# Patient Record
Sex: FEMALE | Race: ASIAN | NOT HISPANIC OR LATINO | Employment: FULL TIME
[De-identification: names, ages, dates, MRNs, and addresses within clinical notes are randomized per-mention and may not be internally consistent; named-entity substitution may affect disease eponyms.]

---

## 2021-02-25 ENCOUNTER — RECORDS - HEALTHEAST (OUTPATIENT)
Dept: ADMINISTRATIVE | Facility: OTHER | Age: 46
End: 2021-02-25

## 2021-02-25 LAB
ABO (EXTERNAL): NORMAL
HBA1C MFR BLD: 5.8 % (ref 0–5.6)
HEMOGLOBIN (EXTERNAL): 10.6 G/DL (ref 11.7–15.5)
HEPATITIS B SURFACE ANTIGEN (EXTERNAL): NONREACTIVE
HIV 1&2 EXT: NORMAL
HIV1+2 AB SERPL QL IA: NONREACTIVE
PLATELET COUNT (EXTERNAL): 476 10E3/UL (ref 140–400)
RH (EXTERNAL): POSITIVE
RUBELLA ANTIBODY IGG (EXTERNAL): NORMAL
TREPONEMA PALLIDUM ANTIBODY (EXTERNAL): NONREACTIVE

## 2021-03-01 ENCOUNTER — RECORDS - HEALTHEAST (OUTPATIENT)
Dept: ADMINISTRATIVE | Facility: OTHER | Age: 46
End: 2021-03-01

## 2021-03-01 ENCOUNTER — MEDICAL CORRESPONDENCE (OUTPATIENT)
Dept: EDUCATION SERVICES | Facility: CLINIC | Age: 46
End: 2021-03-01

## 2021-03-09 ENCOUNTER — RECORDS - HEALTHEAST (OUTPATIENT)
Dept: ADMINISTRATIVE | Facility: OTHER | Age: 46
End: 2021-03-09

## 2021-03-10 ENCOUNTER — TELEPHONE (OUTPATIENT)
Dept: ADMINISTRATIVE | Facility: CLINIC | Age: 46
End: 2021-03-10

## 2021-03-10 ENCOUNTER — COMMUNICATION - HEALTHEAST (OUTPATIENT)
Dept: ADMINISTRATIVE | Facility: CLINIC | Age: 46
End: 2021-03-10

## 2021-03-10 NOTE — TELEPHONE ENCOUNTER
Diabetes Education Scheduling Outreach #1:    Call to patient to schedule. Left message with phone number to call to schedule.    Plan for 2nd outreach attempt within 1 business day.    [facility]  Diabetes and Nutrition Scheduling

## 2021-03-11 NOTE — TELEPHONE ENCOUNTER
Diabetes Education Scheduling Outreach #2:    Call to patient to schedule. Left message with phone number to call to schedule.    Ashly Young  [place] [facility]  Diabetes and Nutrition Scheduling

## 2021-03-12 ENCOUNTER — RECORDS - HEALTHEAST (OUTPATIENT)
Dept: HEALTH INFORMATION MANAGEMENT | Facility: CLINIC | Age: 46
End: 2021-03-12

## 2021-03-12 NOTE — TELEPHONE ENCOUNTER
Diabetes Education Scheduling Outreach #3:    Call to patient to schedule. Talked with patient. She works Monday-Friday. She can do something around her lunch on Thursdays, anytime from 11am-1pm. She has had GDM with her twins before and still has her supplies and equipment. She remembers how to check her blood sugars but her main concern would be the diet/nutrition. She stated that even if the first appt is for 30 minutes, she'd take it. I offered a couple of appointments for 03/16 ad 03/18 but patient declined due to her work schedule. Will work with CDE's to see if we can work her in for preferred day/time.    Plan for 4th outreach attempt within 1 business day.    [facility]  Diabetes and Nutrition Scheduling

## 2021-03-18 ENCOUNTER — PATIENT OUTREACH (OUTPATIENT)
Dept: EDUCATION SERVICES | Facility: CLINIC | Age: 46
End: 2021-03-18
Payer: COMMERCIAL

## 2021-03-18 DIAGNOSIS — O99.810 ABNORMAL MATERNAL GLUCOSE TOLERANCE, ANTEPARTUM: ICD-10-CM

## 2021-03-18 DIAGNOSIS — R73.03 PREDIABETES: Primary | ICD-10-CM

## 2021-03-18 PROCEDURE — 98968 PH1 ASSMT&MGMT NQHP 21-30: CPT | Mod: 95

## 2021-03-18 NOTE — ADDENDUM NOTE
Addended by: GERA DA SILVA on: 3/18/2021 01:44 PM     Modules accepted: Orders, Level of Service

## 2021-03-18 NOTE — PROGRESS NOTES
Diabetes Self-Management Education & Support    SUBJECTIVE/OBJECTIVE:  Presents for education related to gestational diabetes.    Accompanied by: Self  Diabetes management related comments/concerns: reports she had GDM in previous pregnancy with her twins about a year and a half ago. Has been feeling unwell recently with morning sickness, fatigue, likely related to low iron levels. Will be tested for thalassemia at next OB visit.  Gestational weeks: 10  Number of previous preganancies: 1  Had any babies over 9 lbs: No  Previously had Gestational Diabetes: Yes    Cultural Influences/Ethnic Background:  [place]    Estimated Date of Delivery: 10/11/21   Hemoglobin A1c: 5.9% on 21    Lifestyle and Health Behaviors:  Exercise:: Currently not exercising  Barrier to exercise: Physical limitation(fatigue)  Meal planning/habits: Smaller portions, Other(cannot tolerate meats or protein foods right now)  Meals include: Breakfast, Lunch, Dinner  Breakfast: 2 pieces toast  Lunch: often eats stephanie a little as it is hard to keep lunch down if she eats, willing to try eating something  Dinner: varies  Beverages: Water  How many servings of fruits/vegetables per day: 2  Biggest challenges to healthy eating: Other(morning sickness)  Pre-tc vitamin?: Yes  Supplements?: Yes  List supplements currently taking: iron  Experiencing nausea?: Yes    Healthy Coping:  Emotional response to diabetes: Ready to learn, Concern for health and well-being, Acceptance  Informal Support system:: Family  Stage of change: PREPARATION (Decided to change - considering how)    Current Management:  None - will start checking glucose.    ASSESSMENT:  Patient with elevated A1c at 5.9% at 8 weeks gestation. Had GDM with twin pregnancy a year and a half ago. Patient has not yet started checking BG. Discussed BG goals and testing schedule.     INTERVENTION:  Answered questions about iron-rich foods and treating anemia, provided review of carbohydrate  recommendations and glucose monitoring schedule and goals.     Educational topics covered today:  GDM Meal plan, iron-rich foods and taking with vitamin C for maximum absorption.     Educational materials provided today:   [facility] Understanding Gestational Diabetes  GDM Log Book  Sharps Disposal  Care After Delivery  Bedtime Snack list  Iron-rich foods list    Pt verbalized understanding of concepts discussed and recommendations provided today.     PLAN:  Check glucose 4 times daily, before breakfast and 1 hour after each meal.     Check Ketones daily for one week, if negative, reduce testing to once a week.     Physical activity recommended: as able.    Meal plan: 2 carbs at breakfast, 3-4 carbs at lunch, 3-4 carbs at supper, 1-2 carbs at 3 snacks a day.  Follow consistent CHO meal plan, eat CHO and protein/fat at all meals/snacks.    Call/e-mail/[employer] message diabetes educator if 3 or more blood sugars are above the goal in 1 week, if ketones are positive, or with questions/concerns.    Arcelia Hartman, MPH, RDN, LD, CDCES   Time Spent: 22 minutes  Encounter Type: Individual    Any diabetes medication dose changes were made via the CDE Protocol and Collaborative Practice Agreement with the patient's OB/GYN provider. A copy of this encounter was shared with the provider.

## 2021-03-18 NOTE — PATIENT INSTRUCTIONS
Your 1 week follow-up Diabetes Education visit is scheduled on 4/1/21 at 1 pm with Arcelia.    1. Check blood sugar 4 times a day, before breakfast and 1 hour after the start of each meal.     2. Follow the recommended meal plan: eat something every 2-3 hours, include protein/fat and carbohydrate at every meal and snack, have 2 carbs at breakfast, 3-4 carbs at lunch, 3-4 carbs at supper, 1-2 carbs at 3 snacks per day. Bedtime snack should be 2 carbohydrates + about 15 g protein.    3. Add activity to every day, try walking or being active after each meal to help control blood sugar levels.    4.Call or send a [employer] message to your educator if 3 or more blood sugars are above goal in 1 week, you have an elevated ketone result (trace or higher), or with questions or concerns.    [facility] for the [facility]:  For Your Diabetes Education or Nutrition Appointments Call:  [phone]   For Diabetes Education and Nutrition Related Questions:   Phone: [phone]  Send [employer] Message   If you need a medication refill please contact your pharmacy. Please allow 3 business days for your refills to be completed.

## 2021-04-01 ENCOUNTER — VIRTUAL VISIT (OUTPATIENT)
Dept: EDUCATION SERVICES | Facility: CLINIC | Age: 46
End: 2021-04-01
Payer: COMMERCIAL

## 2021-04-01 DIAGNOSIS — O99.810 ABNORMAL MATERNAL GLUCOSE TOLERANCE, ANTEPARTUM: ICD-10-CM

## 2021-04-01 DIAGNOSIS — R73.03 PREDIABETES: Primary | ICD-10-CM

## 2021-04-01 PROCEDURE — 98967 PH1 ASSMT&MGMT NQHP 11-20: CPT | Mod: 95

## 2021-04-01 NOTE — LETTER
2021         RE: Se Kaur  [address] [facility] [address]        Dear Colleague,    Thank you for referring your patient, Se Kaur, to the [facility]. Please see a copy of my visit note below.    Diabetes Self-Management Education & Support  {Note Type:803064}     MD want to follow-up with Endocrinology    A1c 5.9    Fastin's -- 92, 96, 91, 101, 85  Post-meal: 160-170 mg/dL after dinner - 1 hour post-prandial         Diabetes Self-Management Education & Support    Type of Service: Telephone Visit    How would patient like to obtain AVS? Declined need    SUBJECTIVE/OBJECTIVE:  Presents for education related to gestational diabetes.    Accompanied by: Self  Diabetes management related comments/concerns: Reports her OB wants to establish with Endocrinology sooner rather than later, but not urgent.  wonders what the A1c of 5.9% means, if it is high. Reports she has been checking blood sugars, but not 4 times/day. Misses post-breakfast and post-lunch results because she is at work. Post-dinner results are 160-170's.  Gestational weeks: 12  Number of previous preganancies: 1  Had any babies over 9 lbs: No  Previously had Gestational Diabetes: Yes  Had Diabetes Education before: Yes  Previous insulin or other diabetes medication during that preganancy: Yes    Cultural Influences/Ethnic Background:  [facility]    There were no vitals taken for this visit.  Visit was virtual, unable to obtain weight.    Estimated Date of Delivery: Data Unavailable    Blood Glucose/Ketone Log:   Fasting: reports most in the 90's -- 92, 96, 91, 101, 85  Post-meal: reports 160-170 mg/dL after dinner - 1 hour post-prandial     Lifestyle and Health Behaviors:  Exercise:: Currently not exercising  Barrier to exercise: Physical limitation(fatigue)  Meal planning/habits: Smaller portions, Other(cannot tolerate meats or protein foods right now)  Meals include: Breakfast, Lunch, Dinner  Breakfast: 2 pieces  toast  Lunch: often eats stephanie a little as it is hard to keep lunch down if she eats, willing to try eating something  Dinner: varies  Beverages: Water  How many servings of fruits/vegetables per day: 2  Biggest challenges to healthy eating: Other(morning sickness)  Pre-tc vitamin?: Yes  Supplements?: Yes  List supplements currently taking: iron  Experiencing nausea?: Yes    Healthy Coping:  Emotional response to diabetes: Ready to learn, Concern for health and well-being, Acceptance  Informal Support system:: Family  Stage of change: ACTION (Actively working towards change)    Current Management:  Taking medications for gestational diabetes?: No  Difficulty affording diabetes testing supplies?: No    ASSESSMENT:    Fasting blood glucoses: 60% in target.  After breakfast: not available.  After lunch: not available.  After dinner: 0% in target.    Discussed with Se that given elevated post-dinner glucose, recommend visit with Endocrinology soon and would recommend mealtime insulin start with dinner. Data for post-breakfast and lunch not available as she is not checking at these times so unable to determine how glucose is responding to other meals. Patient reports that she is able to eat a little more, but still a lot of nausea and morning sickness. Se would like to wait to start insulin for a week, gather more glucose data, work on following diet a little more closely, and see how numbers are at a follow-up visit next Thursday. This note routed to OB/GYN provider. Discussed with Se the follow-up plan if insulin is started - establish with Baylee Canada NP in Endocrinology and pregnancy clinic. Se verbalizes understanding.     INTERVENTION:  Educational topics covered today:  BG goals, glucose criteria for when insulin is recommended, follow-up plan.    Educational Materials provided today:  No new materials provided today    PLAN:  Check glucose 4 times daily.  Check ketones once a week when readings are consistently  negative.  Continue with recommended physical activity.  Continue to follow recommended meal plan: 2 carbs at breakfast, 3-4 carbs at lunch, 3-4 carbs at supper, 1-2 carbs at snacks.  Follow consistent CHO meal plan, eat CHO and protein/fat at all meals/snacks.    Call/e-mail/MyChart message diabetes educator if 3 or more blood sugars are above the goal in 1 week or if ketones are positive.    Arcelia Hartman, MPH, RDN, LD, CDCES   Time Spent: 15 minutes  Encounter Type: Individual    Any diabetes medication dose changes were made via the CDE Protocol and Collaborative Practice Agreement with the patient's OB/GYN provider. A copy of this encounter was shared with the provider.

## 2021-04-01 NOTE — PROGRESS NOTES
Diabetes Self-Management Education & Support    Type of Service: Telephone Visit    How would patient like to obtain AVS? Declined need    SUBJECTIVE/OBJECTIVE:  Presents for education related to gestational diabetes.    Accompanied by: Self  Diabetes management related comments/concerns: Reports her OB wants to establish with Endocrinology sooner rather than later, but not urgent. Se wonders what the A1c of 5.9% means, if it is high. Reports she has been checking blood sugars, but not 4 times/day. Misses post-breakfast and post-lunch results because she is at work. Post-dinner results are 160-170's.  Gestational weeks: 12  Number of previous preganancies: 1  Had any babies over 9 lbs: No  Previously had Gestational Diabetes: Yes  Had Diabetes Education before: Yes  Previous insulin or other diabetes medication during that preganancy: Yes    Cultural Influences/Ethnic Background:  [facility]    There were no vitals taken for this visit.  Visit was virtual, unable to obtain weight.    Estimated Date of Delivery: Data Unavailable    Blood Glucose/Ketone Log:   Fasting: reports most in the 90's -- 92, 96, 91, 101, 85  Post-meal: reports 160-170 mg/dL after dinner - 1 hour post-prandial     Lifestyle and Health Behaviors:  Exercise:: Currently not exercising  Barrier to exercise: Physical limitation(fatigue)  Meal planning/habits: Smaller portions, Other(cannot tolerate meats or protein foods right now)  Meals include: Breakfast, Lunch, Dinner  Breakfast: 2 pieces toast  Lunch: often eats stephanie a little as it is hard to keep lunch down if she eats, willing to try eating something  Dinner: varies  Beverages: Water  How many servings of fruits/vegetables per day: 2  Biggest challenges to healthy eating: Other(morning sickness)  Pre- vitamin?: Yes  Supplements?: Yes  List supplements currently taking: iron  Experiencing nausea?: Yes    Healthy Coping:  Emotional response to diabetes: Ready to learn, Concern for health and  well-being, Acceptance  Informal Support system:: Family  Stage of change: ACTION (Actively working towards change)    Current Management:  Taking medications for gestational diabetes?: No  Difficulty affording diabetes testing supplies?: No    ASSESSMENT:    Fasting blood glucoses: 60% in target.  After breakfast: not available.  After lunch: not available.  After dinner: 0% in target.    Discussed with Se that given elevated post-dinner glucose, recommend visit with Endocrinology soon and would recommend mealtime insulin start with dinner. Data for post-breakfast and lunch not available as she is not checking at these times so unable to determine how glucose is responding to other meals. Patient reports that she is able to eat a little more, but still a lot of nausea and morning sickness. Se would like to wait to start insulin for a week, gather more glucose data, work on following diet a little more closely, and see how numbers are at a follow-up visit next Thursday. This note routed to OB/GYN provider. Discussed with Se the follow-up plan if insulin is started - establish with Baylee Canada NP in Endocrinology and pregnancy clinic. Se verbalizes understanding.     INTERVENTION:  Educational topics covered today:  BG goals, glucose criteria for when insulin is recommended, follow-up plan.    Educational Materials provided today:  No new materials provided today    PLAN:  Check glucose 4 times daily.  Check ketones once a week when readings are consistently negative.  Continue with recommended physical activity.  Continue to follow recommended meal plan: 2 carbs at breakfast, 3-4 carbs at lunch, 3-4 carbs at supper, 1-2 carbs at snacks.  Follow consistent CHO meal plan, eat CHO and protein/fat at all meals/snacks.    [email or web address] message diabetes educator if 3 or more blood sugars are above the goal in 1 week or if ketones are positive.    Arcelia Hartman, MPH, RDN, LD, CDCES   Time Spent: 15 minutes  Encounter  Type: Individual    Any diabetes medication dose changes were made via the CDE Protocol and Collaborative Practice Agreement with the patient's OB/GYN provider. A copy of this encounter was shared with the provider.

## 2021-04-01 NOTE — LETTER
4/1/2021         RE: Se Kaur  [address] [facility] [address]        Dear Colleague,    Thank you for referring your patient, Se Kaur, to the [facility] Diabetes Education.      is experiencing elevated glucose levels, particularly post-prandially after dinner. Advised insulin start with dinner meal and regularly checking after breakfast and lunch.  would like to begin checking glucose more often and wait to determine insulin start need until next week. Will help to facilitate insulin start and establish with Endocrinology, as appropriate, after our visit next week.    Please see a copy of my visit note below.    Diabetes Self-Management Education & Support    Type of Service: Telephone Visit    How would patient like to obtain AVS? Declined need    SUBJECTIVE/OBJECTIVE:  Presents for education related to gestational diabetes.    Accompanied by: Self  Diabetes management related comments/concerns: Reports her OB wants to establish with Endocrinology sooner rather than later, but not urgent.  wonders what the A1c of 5.9% means, if it is high. Reports she has been checking blood sugars, but not 4 times/day. Misses post-breakfast and post-lunch results because she is at work. Post-dinner results are 160-170's.  Gestational weeks: 12  Number of previous preganancies: 1  Had any babies over 9 lbs: No  Previously had Gestational Diabetes: Yes  Had Diabetes Education before: Yes  Previous insulin or other diabetes medication during that preganancy: Yes    Cultural Influences/Ethnic Background:  [facility]    There were no vitals taken for this visit.  Visit was virtual, unable to obtain weight.    Estimated Date of Delivery: Data Unavailable    Blood Glucose/Ketone Log:   Fasting: reports most in the 90's -- 92, 96, 91, 101, 85  Post-meal: reports 160-170 mg/dL after dinner - 1 hour post-prandial     Lifestyle and Health Behaviors:  Exercise:: Currently not exercising  Barrier to exercise: Physical  limitation(fatigue)  Meal planning/habits: Smaller portions, Other(cannot tolerate meats or protein foods right now)  Meals include: Breakfast, Lunch, Dinner  Breakfast: 2 pieces toast  Lunch: often eats stephanie a little as it is hard to keep lunch down if she eats, willing to try eating something  Dinner: varies  Beverages: Water  How many servings of fruits/vegetables per day: 2  Biggest challenges to healthy eating: Other(morning sickness)  Pre- vitamin?: Yes  Supplements?: Yes  List supplements currently taking: iron  Experiencing nausea?: Yes    Healthy Coping:  Emotional response to diabetes: Ready to learn, Concern for health and well-being, Acceptance  Informal Support system:: Family  Stage of change: ACTION (Actively working towards change)    Current Management:  Taking medications for gestational diabetes?: No  Difficulty affording diabetes testing supplies?: No    ASSESSMENT:    Fasting blood glucoses: 60% in target.  After breakfast: not available.  After lunch: not available.  After dinner: 0% in target.    Discussed with Se that given elevated post-dinner glucose, recommend visit with Endocrinology soon and would recommend mealtime insulin start with dinner. Data for post-breakfast and lunch not available as she is not checking at these times so unable to determine how glucose is responding to other meals. Patient reports that she is able to eat a little more, but still a lot of nausea and morning sickness. Se would like to wait to start insulin for a week, gather more glucose data, work on following diet a little more closely, and see how numbers are at a follow-up visit next Thursday. This note routed to OB/GYN provider. Discussed with Se the follow-up plan if insulin is started - establish with Baylee Canada NP in Endocrinology and pregnancy clinic. Se verbalizes understanding.     INTERVENTION:  Educational topics covered today:  BG goals, glucose criteria for when insulin is recommended,  follow-up plan.    Educational Materials provided today:  No new materials provided today    PLAN:  Check glucose 4 times daily.  Check ketones once a week when readings are consistently negative.  Continue with recommended physical activity.  Continue to follow recommended meal plan: 2 carbs at breakfast, 3-4 carbs at lunch, 3-4 carbs at supper, 1-2 carbs at snacks.  Follow consistent CHO meal plan, eat CHO and protein/fat at all meals/snacks.    Call/e-mail/[employer] message diabetes educator if 3 or more blood sugars are above the goal in 1 week or if ketones are positive.    Arcelia Hartman, MPH, RDN, LD, CDCES   Time Spent: 15 minutes  Encounter Type: Individual    Any diabetes medication dose changes were made via the CDE Protocol and Collaborative Practice Agreement with the patient's OB/GYN provider. A copy of this encounter was shared with the provider.

## 2021-04-01 NOTE — PATIENT INSTRUCTIONS
Follow up with diabetes education for blood glucose and ketone review on 4/8/21 at 12:30 pm    Plan to share your glucose and ketone information with diabetes education once a week, unless otherwise directed.     1. Check glucose 4 times daily, before breakfast daily and 1 hour after each meal, as recommended.    2. Continue with recommended physical activity.    3. Continue to follow recommended meal plan: 2 carbs at breakfast, 3-4 carbs at lunch, 3-4 carbs at supper, 1-2 carbs at snacks.  Follow consistent CHO meal plan, eat CHO and protein/fat at all meals/snacks.    4. Follow-up with OB doctor as recommended.    5. Call or MyChart message your diabetes educator if 3 or more blood sugars are above the goal in 1 week or if ketones are elevated (trace or above).       AFTER YOU DELIVER:  - Continue with healthy eating and physical activity to get back to your pre-pregnancy weight.   - Have a follow-up 2-hour Glucose Tolerance Test at your 6-week post-partum check-up.   - Have your fasting blood sugar checked once a year.  - Plan ahead for future pregnancies - eat healthy, keep active, work with your doctor to check for gestational diabetes early on in the pregnancy and check blood sugars as recommended by your doctor.

## 2021-04-08 ENCOUNTER — COMMUNICATION - HEALTHEAST (OUTPATIENT)
Dept: EDUCATION SERVICES | Facility: CLINIC | Age: 46
End: 2021-04-08

## 2021-04-08 ENCOUNTER — VIRTUAL VISIT (OUTPATIENT)
Dept: EDUCATION SERVICES | Facility: CLINIC | Age: 46
End: 2021-04-08
Payer: COMMERCIAL

## 2021-04-08 DIAGNOSIS — R73.03 PREDIABETES: Primary | ICD-10-CM

## 2021-04-08 DIAGNOSIS — O24.414 INSULIN CONTROLLED GESTATIONAL DIABETES MELLITUS (GDM) IN SECOND TRIMESTER: ICD-10-CM

## 2021-04-08 DIAGNOSIS — O99.810 ABNORMAL MATERNAL GLUCOSE TOLERANCE, ANTEPARTUM: ICD-10-CM

## 2021-04-08 NOTE — PROGRESS NOTES
Diabetes Self-Management Education & Support    Type of Service: Telephone Visit    How would patient like to obtain AVS? Not needed    SUBJECTIVE/OBJECTIVE:  Presents for education related to gestational diabetes.    Accompanied by: Self  Diabetes management related comments/concerns:  reports that all post-meal glucose have been elevated and she is willing to start insulin.  Gestational weeks: 13  Number of previous preganancies: 1  Had any babies over 9 lbs: No  Previously had Gestational Diabetes: Yes  Had Diabetes Education before: Yes  Previous insulin or other diabetes medication during that preganancy: Yes    C[place]  [facility] [place] were no vitals taken for this visit.  Virtual visit did not allow for vitals.    Estimated Date of Delivery: Data Unavailable    Blood Glucose Log:     Fastin, 82, 84, 79, 82, 83, 89    1 hour after meals:   After Breakfast: 147, 145, 150, 152, 149, 150, 142  After Lunch: 150, 152, 153, 156, 149, 152,   After Dinner: 155, 154, 149      Lifestyle and Health Behaviors:  Exercise:: Currently not exercising  Barrier to exercise: Physical limitation(fatigue)  Meal planning/habits: Smaller portions, Other(cannot tolerate meats or protein foods right now)  Meals include: Breakfast, Lunch, Dinner  Breakfast: 2 pieces toast  Lunch: often eats stephanie a little as it is hard to keep lunch down if she eats, willing to try eating something  Dinner: varies  Beverages: Water  How many servings of fruits/vegetables per day: 2  Biggest challenges to healthy eating: Other(morning sickness)  Pre- vitamin?: Yes  Supplements?: Yes  List supplements currently taking: iron  Experiencing nausea?: Yes    Healthy Coping:  Emotional response to diabetes: Ready to learn, Concern for health and well-being, Acceptance  Informal Support system:: Family  Stage of change: ACTION (Actively working towards change)    Current Management:  Taking medications for gestational  diabetes?: No  Difficulty affording diabetes testing supplies?: No    ASSESSMENT:  Fasting blood glucoses: 100% in target.  After breakfast: 0% in target.  After lunch: 0% in target.  After dinner: 0% in target.    Se will benefit from mealtime insulin start. Per Baylee Canada NP, start rapid acting insulin 2 units before each meal. This will be routed to Baylee to be ordered in [employer] instance of [employer] and will have Endocrinology schedulers call to establish with Baylee in pregnancy clinic starting next week.    INTERVENTION:  Educational topics covered today:  Insulin start - Used insulin pen in the past pregnancy, no questions about how to use.     Educational Materials provided today:  No new materials provided today.    PLAN:  Check glucose 4 times daily.  Check ketones once a week when readings are consistently negative.  Continue with recommended physical activity.  Continue to follow recommended meal plan: 30 g carbs at breakfast, 45-60 g carbs at lunch, 45-60 g carbs at supper, 15-30 g carbs at snacks.  Follow consistent CHO meal plan, eat CHO and protein/fat at all meals/snacks.    Call/e-mail/MyChart message diabetes educator if 3 or more blood sugars are above the goal in 1 week or if ketones are positive.    Arcelia Hartman, MPH, RDN, LD, CDCES   Time Spent: 7 minutes  Encounter Type: Individual    Any diabetes medication dose changes were made via the CDE Protocol and Collaborative Practice Agreement with the patient's OB/GYN provider. A copy of this encounter was shared with the provider.

## 2021-04-08 NOTE — LETTER
2021         RE: Se Kaur  [address] [facility] [address]        Dear Colleague,    Thank you for referring your patient, Se Kaur, to the [facility].  has been having elevated post-prandial glucose for the last week. Recommend mealtime insulin start. Referred her to Baylee Canada NP in Endocrinology to establish for insulin start and adjustment.    Please see a copy of my visit note below.    Diabetes Self-Management Education & Support    Type of Service: Telephone Visit    How would patient like to obtain AVS? Not needed    SUBJECTIVE/OBJECTIVE:  Presents for education related to gestational diabetes.    Accompanied by: Self  Diabetes management related comments/concerns:  reports that all post-meal glucose have been elevated and she is willing to start insulin.  Gestational weeks: 13  Number of previous preganancies: 1  Had any babies over 9 lbs: No  Previously had Gestational Diabetes: Yes  Had Diabetes Education before: Yes  Previous insulin or other diabetes medication during that preganancy: Yes    C[place] Influences/[place]nic Background[place]  [facility] [place]      There were no vitals taken for this visit.  Virtual visit did not allow for vitals.    Estimated Date of Delivery: Data Unavailable    Blood Glucose Log:     Fastin, 82, 84, 79, 82, 83, 89    1 hour after meals:   After Breakfast: 147, 145, 150, 152, 149, 150, 142  After Lunch: 150, 152, 153, 156, 149, 152,   After Dinner: 155, 154, 149      Lifestyle and Health Behaviors:  Exercise:: Currently not exercising  Barrier to exercise: Physical limitation(fatigue)  Meal planning/habits: Smaller portions, Other(cannot tolerate meats or protein foods right now)  Meals include: Breakfast, Lunch, Dinner  Breakfast: 2 pieces toast  Lunch: often eats stephanie a little as it is hard to keep lunch down if she eats, willing to try eating something  Dinner: varies  Beverages: Water  How many servings of fruits/vegetables per day:  2  Biggest challenges to healthy eating: Other(morning sickness)  Pre-tc vitamin?: Yes  Supplements?: Yes  List supplements currently taking: iron  Experiencing nausea?: Yes    Healthy Coping:  Emotional response to diabetes: Ready to learn, Concern for health and well-being, Acceptance  Informal Support system:: Family  Stage of change: ACTION (Actively working towards change)    Current Management:  Taking medications for gestational diabetes?: No  Difficulty affording diabetes testing supplies?: No    ASSESSMENT:  Fasting blood glucoses: 100% in target.  After breakfast: 0% in target.  After lunch: 0% in target.  After dinner: 0% in target.    Se will benefit from mealtime insulin start. Per Baylee Canada NP, start rapid acting insulin 2 units before each meal. This will be routed to Baylee to be ordered in [employer] instance of [employer] and will have Endocrinology schedulers call to establish with Baylee in pregnancy clinic starting next week.    INTERVENTION:  Educational topics covered today:  Insulin start - Used insulin pen in the past pregnancy, no questions about how to use.     Educational Materials provided today:  No new materials provided today.    PLAN:  Check glucose 4 times daily.  Check ketones once a week when readings are consistently negative.  Continue with recommended physical activity.  Continue to follow recommended meal plan: 30 g carbs at breakfast, 45-60 g carbs at lunch, 45-60 g carbs at supper, 15-30 g carbs at snacks.  Follow consistent CHO meal plan, eat CHO and protein/fat at all meals/snacks.    Call/e-mail/MyChart message diabetes educator if 3 or more blood sugars are above the goal in 1 week or if ketones are positive.    Arcelia Hartman, MPH, RDN, LD, CDCES   Time Spent: 7 minutes  Encounter Type: Individual    Any diabetes medication dose changes were made via the CDE Protocol and Collaborative Practice Agreement with the patient's OB/GYN provider. A copy of this encounter was shared  with the provider.    Diabetes Self-Management Education & Support    Type of Service: Telephone Visit    How would patient like to obtain AVS? Not needed    SUBJECTIVE/OBJECTIVE:  Presents for education related to gestational diabetes.    Accompanied by: Self  Diabetes management related comments/concerns:  reports that all post-meal glucose have been elevated and she is willing to start insulin.  Gestational weeks: 13  Number of previous preganancies: 1  Had any babies over 9 lbs: No  Previously had Gestational Diabetes: Yes  Had Diabetes Education before: Yes  Previous insulin or other diabetes medication during that preganancy: Yes    Cultural Influences/Ethnic Background:  [place]      There were no vitals taken for this visit.  Virtual visit did not allow for vitals.    Estimated Date of Delivery: Data Unavailable    Blood Glucose Log:     Fastin, 82, 84, 79, 82, 83, 89    1 hour after meals:   After Breakfast: 147, 145, 150, 152, 149, 150, 142  After Lunch: 150, 152, 153, 156, 149, 152,   After Dinner: 155, 154, 149      Lifestyle and Health Behaviors:  Exercise:: Currently not exercising  Barrier to exercise: Physical limitation(fatigue)  Meal planning/habits: Smaller portions, Other(cannot tolerate meats or protein foods right now)  Meals include: Breakfast, Lunch, Dinner  Breakfast: 2 pieces toast  Lunch: often eats stephanie a little as it is hard to keep lunch down if she eats, willing to try eating something  Dinner: varies  Beverages: Water  How many servings of fruits/vegetables per day: 2  Biggest challenges to healthy eating: Other(morning sickness)  Pre- vitamin?: Yes  Supplements?: Yes  List supplements currently taking: iron  Experiencing nausea?: Yes    Healthy Coping:  Emotional response to diabetes: Ready to learn, Concern for health and well-being, Acceptance  Informal Support system:: Family  Stage of change: ACTION (Actively working towards change)    Current Management:  Taking  medications for gestational diabetes?: No  Difficulty affording diabetes testing supplies?: No    ASSESSMENT:  Fasting blood glucoses: 100% in target.  After breakfast: 0% in target.  After lunch: 0% in target.  After dinner: 0% in target.    Se will benefit from mealtime insulin start. Per Baylee Canada NP, start rapid acting insulin 2 units before each meal. This will be routed to Baylee to be ordered in [employer] instance of [employer] and will have Endocrinology schedulers call to establish with Baylee in pregnancy clinic starting next week.    INTERVENTION:  Educational topics covered today:  Insulin start - Used insulin pen in the past pregnancy, no questions about how to use.     Educational Materials provided today:  No new materials provided today.    PLAN:  Check glucose 4 times daily.  Check ketones once a week when readings are consistently negative.  Continue with recommended physical activity.  Continue to follow recommended meal plan: 30 g carbs at breakfast, 45-60 g carbs at lunch, 45-60 g carbs at supper, 15-30 g carbs at snacks.  Follow consistent CHO meal plan, eat CHO and protein/fat at all meals/snacks.    Call/e-mail/MyChart message diabetes educator if 3 or more blood sugars are above the goal in 1 week or if ketones are positive.    Arcelia Hartman, MPH, RDN, LD, CDCES   Time Spent: 7 minutes  Encounter Type: Individual    Any diabetes medication dose changes were made via the CDE Protocol and Collaborative Practice Agreement with the patient's OB/GYN provider. A copy of this encounter was shared with the provider.

## 2021-04-15 ENCOUNTER — COMMUNICATION - HEALTHEAST (OUTPATIENT)
Dept: EDUCATION SERVICES | Facility: CLINIC | Age: 46
End: 2021-04-15

## 2021-05-24 ENCOUNTER — RECORDS - HEALTHEAST (OUTPATIENT)
Dept: ADMINISTRATIVE | Facility: OTHER | Age: 46
End: 2021-05-24

## 2021-05-25 ENCOUNTER — TELEPHONE (OUTPATIENT)
Dept: MATERNAL FETAL MEDICINE | Facility: CLINIC | Age: 46
End: 2021-05-25

## 2021-05-25 NOTE — CONFIDENTIAL NOTE
"Reviewed referral to [facility] from [facility] due to abnormal genetic testing results.  is 20w1d today. She had a panorama drawn twice and each gave no results due to \"uninfromative (suspect nonmatching) maternal/fetal DNA patterns. Possible reasons for uninformative DNA patterns include but are not limited to; egg donor, surrogate pregnancy, bone marrow transplantation\". Per an included documentation from a [facility] genetic counselor,  confirmed there was no use of donors and she is biologically related to the fetus. She has no history of a transplant. The genetic counselor provided an alternative explanation that  could be a chimera which means a person has two genotypes. Not documented in the note, other explanations would be rare such as partial or full genome wide paternal uniparental disomy. Since this result is so unique it is difficult to determine if this increases risks for genetic problems in the fetus but it is possible. Genetic counseling and ultrasound will be recommended with the option for genetic amniocentesis. If  would use this information for decision making such as termination, we would encouraged these appointments soon.      is scheduled for [facility] appointments on 06/08/21. However, we would like to inquire if she would like to be seen earlier. Called  and left voicemail asking her to call back to review the referral. If we do not hear from her we will keep the 06/08/21 appointments.       Aline Herron MS, [facility]  Licensed Genetic Counselor   [facility]  Maternal Fetal Medicine  [email or web address]  [email or web address]  Office: [phone]  Pager [phone]  [facility]: [phone]   Fax: [phone]                                    "

## 2021-06-04 ENCOUNTER — PRE VISIT (OUTPATIENT)
Dept: MATERNAL FETAL MEDICINE | Facility: CLINIC | Age: 46
End: 2021-06-04

## 2021-06-08 ENCOUNTER — HOSPITAL ENCOUNTER (OUTPATIENT)
Dept: ULTRASOUND IMAGING | Facility: CLINIC | Age: 46
End: 2021-06-08
Attending: NURSE PRACTITIONER
Payer: COMMERCIAL

## 2021-06-08 ENCOUNTER — OFFICE VISIT (OUTPATIENT)
Dept: MATERNAL FETAL MEDICINE | Facility: CLINIC | Age: 46
End: 2021-06-08
Attending: NURSE PRACTITIONER
Payer: COMMERCIAL

## 2021-06-08 DIAGNOSIS — O26.90 PREGNANCY RELATED CONDITION, ANTEPARTUM: ICD-10-CM

## 2021-06-08 DIAGNOSIS — O09.522 SUPERVISION OF ELDERLY MULTIGRAVIDA IN SECOND TRIMESTER: Primary | ICD-10-CM

## 2021-06-08 DIAGNOSIS — O24.119 PRE-EXISTING TYPE 2 DIABETES MELLITUS DURING PREGNANCY, ANTEPARTUM: ICD-10-CM

## 2021-06-08 DIAGNOSIS — O09.522 MULTIGRAVIDA OF ADVANCED MATERNAL AGE IN SECOND TRIMESTER: Primary | ICD-10-CM

## 2021-06-08 PROCEDURE — 76811 OB US DETAILED SNGL FETUS: CPT

## 2021-06-08 PROCEDURE — 76811 OB US DETAILED SNGL FETUS: CPT | Mod: 26 | Performed by: OBSTETRICS & GYNECOLOGY

## 2021-06-08 PROCEDURE — 96040 HC GENETIC COUNSELING, EACH 30 MINUTES: CPT | Performed by: GENETIC COUNSELOR, MS

## 2021-06-08 NOTE — PROGRESS NOTES
"Please see \"Imaging\" tab under \"Chart Review\" for details of today's US at the [facility].    Osiel Nagel MD  Maternal-Fetal Medicine      "

## 2021-06-08 NOTE — PROGRESS NOTES
[facility]  Genetic Counseling Consult    Patient:  Se Kaur YOB: 1975   Date of Service:  21      Se Kaur was seen at the [facility] for genetic consultation as part of her appointment for comprehensive ultrasound.  The indication for genetic counseling is advanced maternal age.       Impression/Plan:   1.  had a cell-free fetal DNA test earlier in pregnancy, which was unable to provide results (discussed below) for the common trisomies due to no match between fetal and maternal DNA. This result is rare.     2.  had a comprehensive (level II) ultrasound today.  Please see the ultrasound report for further details.    3. The patient declines genetic amniocentesis and additional maternal serum screening today. We discussed options for a cell-free DNA that are not on a SNP platform that may provide results.  was unsure of these options and declined.     4. 's bloodwork was concerning for possible carrier status of alpha thalassemia. To determine what type of carrier  is and how this could affect reproductive risks, molecular carrier screening is recommended for  and her  (depending on her results). We discussed that the three healthy children they have together is reassuring. We also discussed that hemoglobin H disease is included on the [place] screening program.  did not want to move forward with testing at the time but would like time to consider and contact me.     5.  wanted to go home and share all this information and options with her . She will contact genetic counseling if she would like to proceed with any of the available options.     Pregnancy History:   /Parity:     Age at Delivery: 46 year old  MARCOS: 10/11/2021, by Last Menstrual Period  Gestational Age: 22w1d     s pregnancy history is significant for one full term () healthy daughter, and full  term twins () healthy daughter and son. The twin pregnancy was complicated by gestational diabetes and pre-eclampsia that result in an emergency     In this pregnancy,  is taking aspirin and insulin    Medical History:    s reported medical history is not expected to impact pregnancy management or risks to fetal development.       Family History:   A three-generation pedigree was obtained, and is scanned under the  Media  tab.   The following significant findings were reported by :    The father of the pregnancy, Patrick, 50, is healthy.      and Patrick have a healthy daughter (6) and healthy twins daughter and son (16 months)      The family history is unremarkable. There is no family history of thalassemia or blood transfusions     Otherwise, the reported family history is negative for multiple miscarriages, stillbirths, birth defects, mental retardation, known genetic conditions, and consanguinity.       Carrier Screening:   The patient reports that she and the father of the pregnancy have  ancestry:      The hemoglobinopathies are a group of genetic blood diseases that occur with increased frequency in individuals of  ancestry and carrier screening for these conditions is available.  Carrier screening for the hemoglobinopathies includes a CBC with red blood cell indices, a ferritin level, and a quantitative hemoglobin electrophoresis or HPLC.  In addition,  screening in the [facility] includes many of the hemoglobinopathies.      Expanded carrier screening for mutations in a large panel of genes associated with autosomal recessive conditions including cystic fibrosis, spinal muscular atrophy, and others, is now available.      The patient has declined the carrier screening options reviewed today.    's bloodwork was concerning for possible carrier status of alpha thalassemia. To determine what type of carrier  is and how this could affect reproductive risks,  molecular carrier screening is recommended for  and her  (depending on her results). We discussed that the three healthy children they have together is reassuring. We also discussed that hemoglobin H disease is included on the [place] screening program.     Hemoglobin is a major component of red blood cells. Hemoglobinopathies are conditions that affect the level or structure of the hemoglobin and cause conditions such as thalassemia or conditions like sickle cell disease. An individual with a hemoglobinopathy often inherited the condition from their carrier parents. Some carriers can also have symptoms. Lab values like mean corpuscular volume (MCV) can suggest a hemoglobinopathy is present when the value is low. To screen for hemoglobinopathies a hemoglobin electrophoresis can be used to screen for beta thalassemia, or other hemoglobin variants like sickle cell disease. However, alpha thalassemia is often not detected on hemoglobin electrophoresis. Carrier screening by molecular sequencing methods is also available and can be helpful, especially in the case of alpha thalassemia.    The following is true for a typical individual:    4 copies of alpha genes that encode for the alpha subunit of hemoglobin (2 copies of HBA1 gene + 2 copies of HBA2 gene)     2 copies of HBB gene that encodes for the beta subunit     2 copies of HBD gene that encodes for the delta subunit     4 copies of gamma genes that encode for the gamma subunit of hemoglobin. This is typically only expressed during fetal development and decreases after birth when the beta subunit is more represented    The typical individual has the following hemoglobin composition:     >87% Hemoglobin A (HbA) which is made up of two alpha subunits and two beta subunits    <3% Hemoglobin A2 (HbA2) which is made up of two alpha subunits and two delta subunits     <2% Hemoglobin F which is made up of two alpha subunits and two gamma subunits (higher  hemoglobin F can be protective if the individual has a hemoglobinopathy)    Thalassemia conditions are caused by reductions in the alpha or beta subunit. Variant conditions, like sickle cell disease, are cause by mutations in the HBB gene that modify the structure or function of the beta subunit. Hemoglobinopathies have a wide spectrum of symptoms because an individual can have a condition due to the combination of different mutations or even mutations on an alpha and beta gene. Many hemoglobinopathies are screened for on the [place] Screening program.    Carriers for alpha thalassemia have either two or three functional alpha globin gene and do not have any symptoms. Individuals with three alpha globin copies (áá/á-), are considered a silent carrier as they do not have any symptoms or abnormal blood count.    Individuals with two alpha globin copies are carriers that may have mild anemia or small red blood cells. They may also have an abnormal blood count but generally do not have significant symptoms. Carriers may have two copies on one chromosome (áá/--) or two copies, one of each chromosome (á-/á-). The arrangement of alpha copies is important to determine because in the situation in which the two copies are on one chromosome, that parent could pass on zero copies of alpha genes (--) or (áá).       Individuals with only one alpha globin copy (á-/--) have alpha thalassemia and are said to have hemoglobin H disease in which symptoms can range from asymptomatic to anemia with jaundice, fatigue, bone deformities, fatigue, and other minor complications. Individuals with zero alpha globin copies (--/--) have hemoglobin Julio syndrome. This condition is associated with death in utero due to hydrops fetalis. If born an affected baby will be stillborn or die soon after birth.     Individuals who are carriers of alpha thalassemia often have normal hemoglobin electrophoresis. However, they may have low MCV and/or  "MCH levels on blood work. Molecular testing is available to detect a deletion of the alpha globin gene. Some molecular testing laboratories only look for the most common deletions or mutations while other choices that sequence the gene would detect many more deletions.     Risk Assessment for Chromosome Conditions:   We explained that the risk for fetal chromosome abnormalities increases with maternal age. We discussed specific features of common chromosome abnormalities, including Down syndrome, trisomy 13, trisomy 18, and sex chromosome trisomies.      At age 46 at midtrimester, the risk to have a baby with Down syndrome is 1 in 15.     At age 46 at midtrimester, the risk to have a baby with any chromosome abnormality is 1 in 9.        had maternal serum screening earlier in pregnancy, that unfortunately did not provide a result.     Non-invasive Prenatal Testing (NIPT)    Maternal plasma cell-free DNA testing    Screens for fetal trisomy 21, trisomy 13, trisomy 18, and sex chromosome aneuploidy    First trimester ultrasound with nuchal translucency and nasal bone assessment was not performed in this pregnancy, to our knowledge.     had a Panorama test earlier in pregnancy.    The panorama did not provide a result due to abnormal data. The panorama was drawn twice and each gave no results due to \"uninfromative (suspect nonmatching) maternal/fetal DNA patterns. Possible reasons for uninformative DNA patterns include but are not limited to; egg donor, surrogate pregnancy, bone marrow transplantation\". Per an included documentation from a [facility] genetic counselor,  confirmed there was no use of donors and she is biologically related to the fetus. She has no history of a transplant. The genetic counselor provided an alternative explanation that  could be a chimera which means a person has two genotypes. Not documented in the note, other explanations would be rare such as partial or full genome wide paternal " uniparental disomy. Since this result is so unique it is difficult to determine if this increases risks for genetic problems in the fetus but it is possible.     Options for more information could include a cell-free DNA screen that is not run on the SNP platform like [employer]. This could still provide an answer on common trisomies without being complicated by different genetic patterns.     Testing Options:   We discussed the following options:   Genetic Amniocentesis    Invasive procedure typically performed in the second trimester by which amniotic fluid is obtained for the purpose of chromosome analysis and/or other prenatal genetic analysis    Diagnostic results; >99% sensitivity for fetal chromosome abnormalities    AFAFP measurement tests for open neural tube defects     Comprehensive (Level II) ultrasound: Detailed ultrasound performed between 18-22 weeks gestation to screen for major birth defects and markers for aneuploidy.    We reviewed the benefits and limitations of this testing.  Screening tests provide a risk assessment specific to the pregnancy for certain fetal chromosome abnormalities, but cannot definitively diagnose or exclude a fetal chromosome abnormality.  Follow-up genetic counseling and consideration of diagnostic testing is recommended with any abnormal screening result.     Diagnostic tests carry inherent risks- including risk of miscarriage- that require careful consideration.  These tests can detect fetal chromosome abnormalities with greater than 99% certainty.  Results can be compromised by maternal cell contamination or mosaicism, and are limited by the resolution of cytogenetic G-banding technology.  There is no screening nor diagnostic test that can detect all forms of birth defects or mental disability.    It was a pleasure to be involved with [facility] care. Face-to-face time of the meeting was 45 minutes.      Aline Herron MS, [facility]  Licensed Genetic Counselor   [facility]   Maternal Fetal Medicine  [email or web address]  [employer] [email or web address]  Office: [phone]  Pager [phone]  [facility]: [phone]   Fax: [phone]

## 2021-06-15 NOTE — TELEPHONE ENCOUNTER
Records received  3/9/2021 10:29am inside DM Consult  [facility] -   Referring: Mayra Santos  DX: Pre-Diabetes & Pregnancy    3/10 update - referring wanting ASAP appt. Records forwarded to [facility] for sooner appt.

## 2021-06-16 PROBLEM — O09.523 MULTIGRAVIDA OF ADVANCED MATERNAL AGE IN THIRD TRIMESTER: Status: ACTIVE | Noted: 2019-08-15

## 2021-06-16 PROBLEM — O30.043 DICHORIONIC DIAMNIOTIC TWIN PREGNANCY IN THIRD TRIMESTER: Status: ACTIVE | Noted: 2019-08-15

## 2021-06-16 PROBLEM — R93.5 ABNORMAL ULTRASOUND OF ENDOMETRIUM: Status: ACTIVE | Noted: 2019-04-12

## 2021-06-16 PROBLEM — O60.00 PRETERM LABOR: Status: ACTIVE | Noted: 2020-02-18

## 2021-06-16 PROBLEM — Z12.4 CERVICAL CANCER SCREENING: Status: ACTIVE | Noted: 2017-09-29

## 2021-06-16 PROBLEM — O44.40 LOW-LYING PLACENTA: Status: ACTIVE | Noted: 2019-10-31

## 2021-06-16 PROBLEM — O24.410 DIET CONTROLLED GESTATIONAL DIABETES MELLITUS (GDM), ANTEPARTUM: Status: ACTIVE | Noted: 2020-01-23

## 2021-06-16 PROBLEM — N97.9 INFERTILITY, FEMALE: Status: ACTIVE | Noted: 2017-10-06

## 2021-06-16 NOTE — TELEPHONE ENCOUNTER
Date: 4/29/2021 Status: [facility]   Time: 1:15 PM Length: 20   Visit Type: TELEPHONE VISIT RETURN [[id number]] Copay: $0.00   Provider: Yanely Canada NP

## 2021-06-16 NOTE — TELEPHONE ENCOUNTER
Called pt again. Rxs sent to her pharmacy.     Unable to get pt into PC next week. She needs appt between 1-2pm. I will f/u with pt next week. Then schedule into PC the following week.       Schedulers - please schedule pt in PC 4/22 - at 1pm (ok to DB) appt notes: to call pt at 1:15

## 2021-06-16 NOTE — TELEPHONE ENCOUNTER
Met with Se for diabetes education follow-up - documentation in [place] Instance of Epic is below.    Pt has 0% of all post-prandial glucose elevated above goal. Mealtime insulin start indicated. Pended insulin order for 2 units three times a day with meals and pen needles for signature by Baylee Canada NP. Pt will need to get established with Baylee in Pregnancy clinic starting next week.    Arcelia Hartman, MPH, RDN, LD, [facility]     Diabetes Self-Management Education & Support    Type of Service: Telephone Visit    How would patient like to obtain AVS? Not needed    SUBJECTIVE/OBJECTIVE:  Presents for education related to gestational diabetes.    Accompanied by: Self  Diabetes management related comments/concerns: Se reports that all post-meal glucose have been elevated and she is willing to start insulin.  Gestational weeks: 13  Number of previous preganancies: 1  Had any babies over 9 lbs: No  Previously had Gestational Diabetes: Yes  Had Diabetes Education before: Yes  Previous insulin or other diabetes medication during that preganancy: Yes    Cultural Influences/Ethnic Background:  [facility]      There were no vitals taken for this visit.  Virtual visit did not allow for vitals.    Estimated Date of Delivery: Data Unavailable    Blood Glucose Log:     Fastin, 82, 84, 79, 82, 83, 89    1 hour after meals:   After Breakfast: 147, 145, 150, 152, 149, 150, 142  After Lunch: 150, 152, 153, 156, 149, 152,   After Dinner: 155, 154, 149      Lifestyle and Health Behaviors:  Exercise:: Currently not exercising  Barrier to exercise: Physical limitation(fatigue)  Meal planning/habits: Smaller portions, Other(cannot tolerate meats or protein foods right now)  Meals include: Breakfast, Lunch, Dinner  Breakfast: 2 pieces toast  Lunch: often eats stephanie a little as it is hard to keep lunch down if she eats, willing to try eating something  Dinner: varies  Beverages: Water  How many  servings of fruits/vegetables per day: 2  Biggest challenges to healthy eating: Other(morning sickness)  Pre- vitamin?: Yes  Supplements?: Yes  List supplements currently taking: iron  Experiencing nausea?: Yes    Healthy Coping:  Emotional response to diabetes: Ready to learn, Concern for health and well-being, Acceptance  Informal Support system:: Family  Stage of change: ACTION (Actively working towards change)    Current Management:  Taking medications for gestational diabetes?: No  Difficulty affording diabetes testing supplies?: No    ASSESSMENT:  Fasting blood glucoses: 100% in target.  After breakfast: 0% in target.  After lunch: 0% in target.  After dinner: 0% in target.    Se will benefit from mealtime insulin start. Per Baylee Canada NP, start rapid acting insulin 2 units before each meal. This will be routed to Baylee to be ordered in [employer] instance of [employer] and will have Endocrinology schedulers call to establish with Baylee in pregnancy clinic starting next week.    INTERVENTION:  Educational topics covered today:  Insulin start - Used insulin pen in the past pregnancy, no questions about how to use.     Educational Materials provided today:  No new materials provided today.    PLAN:  Check glucose 4 times daily.  Check ketones once a week when readings are consistently negative.  Continue with recommended physical activity.  Continue to follow recommended meal plan: 30 g carbs at breakfast, 45-60 g carbs at lunch, 45-60 g carbs at supper, 15-30 g carbs at snacks.  Follow consistent CHO meal plan, eat CHO and protein/fat at all meals/snacks.    Call/e-mail/MyChart message diabetes educator if 3 or more blood sugars are above the goal in 1 week or if ketones are positive.    Arcelia Hartman, MPH, RDN, LD, CDCES   Time Spent: 7 minutes  Encounter Type: Individual    Any diabetes medication dose changes were made via the CDE Protocol and Collaborative Practice Agreement with the patient's OB/GYN  provider. A copy of this encounter was shared with the provider.

## 2021-08-10 ENCOUNTER — HOSPITAL ENCOUNTER (OUTPATIENT)
Dept: CARDIOLOGY | Facility: CLINIC | Age: 46
Discharge: HOME | End: 2021-08-10
Attending: OBSTETRICS & GYNECOLOGY | Admitting: OBSTETRICS & GYNECOLOGY
Payer: COMMERCIAL

## 2021-08-10 ENCOUNTER — OFFICE VISIT (OUTPATIENT)
Dept: CARDIOLOGY | Facility: CLINIC | Age: 46
End: 2021-08-10
Payer: COMMERCIAL

## 2021-08-10 DIAGNOSIS — O24.119 PRE-EXISTING TYPE 2 DIABETES MELLITUS DURING PREGNANCY, ANTEPARTUM: ICD-10-CM

## 2021-08-10 DIAGNOSIS — O35.BXX0 FETAL CARDIAC DISEASE AFFECTING PREGNANCY, SINGLE OR UNSPECIFIED FETUS: Primary | ICD-10-CM

## 2021-08-10 PROCEDURE — 76825 ECHO EXAM OF FETAL HEART: CPT | Mod: 26 | Performed by: PEDIATRICS

## 2021-08-10 PROCEDURE — 99202 OFFICE O/P NEW SF 15 MIN: CPT | Mod: 25 | Performed by: PEDIATRICS

## 2021-08-10 PROCEDURE — 76827 ECHO EXAM OF FETAL HEART: CPT

## 2021-08-10 PROCEDURE — 93325 DOPPLER ECHO COLOR FLOW MAPG: CPT

## 2021-08-10 PROCEDURE — 76827 ECHO EXAM OF FETAL HEART: CPT | Mod: 26 | Performed by: PEDIATRICS

## 2021-08-10 PROCEDURE — 93325 DOPPLER ECHO COLOR FLOW MAPG: CPT | Mod: 26 | Performed by: PEDIATRICS

## 2021-08-10 NOTE — PROGRESS NOTES
[facility]   Heart Center Fetal Consult Note    Patient:  Se Kaur MRN:  [id number]   YOB: 1975 Age:  46 year old   Date of Visit:  8/10/2021 PCP:  No primary care provider on file.     Dear Doctor,     I had the pleasure of seeing Se Kaur at the [facility] on 8/10/2021 in fetal cardiology consultation for fetal echocardiogram results. She presented today accompanied by herself. As you know, she is a 46 year old  at 31w1d who presented for fetal echocardiogram today because of diabetes mellitus.    I performed and interpreted the fetal echocardiogram today, which demonstrated normal fetal cardiac anatomy. Normal fetal intracardiac connections. Normal right and left ventricular size and function. Fetal heart rate is regular at 133 bpm. No hydrops.     I reviewed the echo findings today with Se Kaur. She is aware that the study was within normal limits with no major cardiac abnormalities. She is aware of the general limitations of fetal echocardiography. No additional fetal echocardiograms are recommended. No  cardiac follow-up is required.     Thank you for allowing me to participate in 's care. Please do not hesitate to contact me with questions or concerns.    This visit was separate from the performance and interpretation of the ultrasound. The majority of the time (>50%) was spent in counseling and coordination of care. I spent approximately 15 minutes in face-to-face time reviewing the above considerations.    Saurav Israel M.D.  Pediatric Cardiology  [address], 5th floor, [facility] [address]  Phone [phone]  Fax [phone]

## 2021-09-07 ENCOUNTER — TRANSFERRED RECORDS (OUTPATIENT)
Dept: HEALTH INFORMATION MANAGEMENT | Facility: CLINIC | Age: 46
End: 2021-09-07

## 2021-09-14 ENCOUNTER — TRANSFERRED RECORDS (OUTPATIENT)
Dept: HEALTH INFORMATION MANAGEMENT | Facility: CLINIC | Age: 46
End: 2021-09-14

## 2021-09-17 LAB — GROUP B STREPTOCOCCUS (EXTERNAL): NEGATIVE

## 2021-09-26 ENCOUNTER — ANESTHESIA EVENT (OUTPATIENT)
Dept: OBGYN | Facility: CLINIC | Age: 46
End: 2021-09-26
Payer: COMMERCIAL

## 2021-09-26 ENCOUNTER — HOSPITAL ENCOUNTER (INPATIENT)
Facility: CLINIC | Age: 46
LOS: 2 days | Discharge: HOME | End: 2021-09-28
Attending: REGISTERED NURSE | Admitting: REGISTERED NURSE
Payer: COMMERCIAL

## 2021-09-26 ENCOUNTER — ANESTHESIA (OUTPATIENT)
Dept: OBGYN | Facility: CLINIC | Age: 46
End: 2021-09-26
Payer: COMMERCIAL

## 2021-09-26 PROBLEM — Z34.90 PREGNANT: Status: ACTIVE | Noted: 2021-09-26

## 2021-09-26 LAB
ABO/RH(D): NORMAL
ANTIBODY SCREEN: NEGATIVE
APTT PPP: 25 SECONDS (ref 22–38)
BASOPHILS # BLD AUTO: 0 10E3/UL (ref 0–0.2)
BASOPHILS NFR BLD AUTO: 1 %
BLD PROD TYP BPU: NORMAL
BLOOD COMPONENT TYPE: NORMAL
CALCIUM, IONIZED MEASURED: 1.04 MMOL/L (ref 1.11–1.3)
CODING SYSTEM: NORMAL
CROSSMATCH: NORMAL
D DIMER PPP FEU-MCNC: 10.88 UG/ML FEU (ref 0–0.5)
EOSINOPHIL # BLD AUTO: 0.1 10E3/UL (ref 0–0.7)
EOSINOPHIL NFR BLD AUTO: 1 %
ERYTHROCYTE [DISTWIDTH] IN BLOOD BY AUTOMATED COUNT: 19.2 % (ref 10–15)
ERYTHROCYTE [DISTWIDTH] IN BLOOD BY AUTOMATED COUNT: 19.3 % (ref 10–15)
FIBRINOGEN PPP-MCNC: 336 MG/DL (ref 170–490)
GLUCOSE BLDC GLUCOMTR-MCNC: 123 MG/DL (ref 70–99)
GLUCOSE BLDC GLUCOMTR-MCNC: 134 MG/DL (ref 70–99)
GLUCOSE BLDC GLUCOMTR-MCNC: 71 MG/DL (ref 70–99)
GLUCOSE BLDC GLUCOMTR-MCNC: 76 MG/DL (ref 70–99)
GLUCOSE BLDC GLUCOMTR-MCNC: 77 MG/DL (ref 70–99)
GLUCOSE BLDC GLUCOMTR-MCNC: 85 MG/DL (ref 70–99)
GLUCOSE BLDC GLUCOMTR-MCNC: 87 MG/DL (ref 70–99)
GLUCOSE BLDC GLUCOMTR-MCNC: 91 MG/DL (ref 70–99)
GLUCOSE BLDC GLUCOMTR-MCNC: 95 MG/DL (ref 70–99)
HBA1C MFR BLD: 6.5 %
HCT VFR BLD AUTO: 24 % (ref 35–47)
HCT VFR BLD AUTO: 30.3 % (ref 35–47)
HGB BLD-MCNC: 6.6 G/DL (ref 11.7–15.7)
HGB BLD-MCNC: 7.1 G/DL (ref 11.7–15.7)
HGB BLD-MCNC: 8.7 G/DL (ref 11.7–15.7)
IMM GRANULOCYTES # BLD: 0.1 10E3/UL
IMM GRANULOCYTES NFR BLD: 1 %
INR PPP: 1 (ref 0.85–1.15)
ION CA PH 7.4: 0.99 MMOL/L (ref 1.11–1.3)
ISSUE DATE AND TIME: NORMAL
ISSUE DATE AND TIME: NORMAL
LYMPHOCYTES # BLD AUTO: 1.4 10E3/UL (ref 0.8–5.3)
LYMPHOCYTES NFR BLD AUTO: 22 %
MCH RBC QN AUTO: 19 PG (ref 26.5–33)
MCH RBC QN AUTO: 19.1 PG (ref 26.5–33)
MCHC RBC AUTO-ENTMCNC: 27.5 G/DL (ref 31.5–36.5)
MCHC RBC AUTO-ENTMCNC: 28.7 G/DL (ref 31.5–36.5)
MCV RBC AUTO: 66 FL (ref 78–100)
MCV RBC AUTO: 69 FL (ref 78–100)
MONOCYTES # BLD AUTO: 0.5 10E3/UL (ref 0–1.3)
MONOCYTES NFR BLD AUTO: 8 %
NEUTROPHILS # BLD AUTO: 4.3 10E3/UL (ref 1.6–8.3)
NEUTROPHILS NFR BLD AUTO: 67 %
NRBC # BLD AUTO: 0 10E3/UL
NRBC BLD AUTO-RTO: 1 /100
PH: 7.32 (ref 7.35–7.45)
PLATELET # BLD AUTO: 206 10E3/UL (ref 150–450)
PLATELET # BLD AUTO: 206 10E3/UL (ref 150–450)
PLATELET # BLD AUTO: 267 10E3/UL (ref 150–450)
POTASSIUM BLD-SCNC: 4 MMOL/L (ref 3.5–5)
RBC # BLD AUTO: 3.46 10E6/UL (ref 3.8–5.2)
RBC # BLD AUTO: 4.58 10E6/UL (ref 3.8–5.2)
SARS-COV-2 RNA RESP QL NAA+PROBE: NEGATIVE
SPECIMEN EXPIRATION DATE: NORMAL
UNIT ABO/RH: NORMAL
UNIT NUMBER: NORMAL
UNIT STATUS: NORMAL
UNIT TYPE ISBT: 5100
UNIT TYPE ISBT: 7300
UNIT TYPE ISBT: 7300
WBC # BLD AUTO: 11.5 10E3/UL (ref 4–11)
WBC # BLD AUTO: 6.4 10E3/UL (ref 4–11)

## 2021-09-26 PROCEDURE — 250N000011 HC RX IP 250 OP 636: Performed by: ANESTHESIOLOGY

## 2021-09-26 PROCEDURE — 86901 BLOOD TYPING SEROLOGIC RH(D): CPT | Performed by: REGISTERED NURSE

## 2021-09-26 PROCEDURE — 258N000003 HC RX IP 258 OP 636: Performed by: REGISTERED NURSE

## 2021-09-26 PROCEDURE — 250N000009 HC RX 250: Performed by: ANESTHESIOLOGY

## 2021-09-26 PROCEDURE — 85025 COMPLETE CBC W/AUTO DIFF WBC: CPT | Performed by: REGISTERED NURSE

## 2021-09-26 PROCEDURE — 85379 FIBRIN DEGRADATION QUANT: CPT | Performed by: REGISTERED NURSE

## 2021-09-26 PROCEDURE — 36415 COLL VENOUS BLD VENIPUNCTURE: CPT | Performed by: REGISTERED NURSE

## 2021-09-26 PROCEDURE — 83036 HEMOGLOBIN GLYCOSYLATED A1C: CPT | Performed by: REGISTERED NURSE

## 2021-09-26 PROCEDURE — P9016 RBC LEUKOCYTES REDUCED: HCPCS | Performed by: REGISTERED NURSE

## 2021-09-26 PROCEDURE — 250N000012 HC RX MED GY IP 250 OP 636 PS 637: Performed by: REGISTERED NURSE

## 2021-09-26 PROCEDURE — 85049 AUTOMATED PLATELET COUNT: CPT | Performed by: REGISTERED NURSE

## 2021-09-26 PROCEDURE — 88307 TISSUE EXAM BY PATHOLOGIST: CPT | Mod: TC | Performed by: REGISTERED NURSE

## 2021-09-26 PROCEDURE — 250N000009 HC RX 250: Performed by: REGISTERED NURSE

## 2021-09-26 PROCEDURE — 86780 TREPONEMA PALLIDUM: CPT | Performed by: REGISTERED NURSE

## 2021-09-26 PROCEDURE — 00HU33Z INSERTION OF INFUSION DEVICE INTO SPINAL CANAL, PERCUTANEOUS APPROACH: ICD-10-PCS | Performed by: REGISTERED NURSE

## 2021-09-26 PROCEDURE — 250N000011 HC RX IP 250 OP 636: Performed by: REGISTERED NURSE

## 2021-09-26 PROCEDURE — 370N000003 HC ANESTHESIA WARD SERVICE

## 2021-09-26 PROCEDURE — 87635 SARS-COV-2 COVID-19 AMP PRB: CPT | Performed by: REGISTERED NURSE

## 2021-09-26 PROCEDURE — 85018 HEMOGLOBIN: CPT | Performed by: REGISTERED NURSE

## 2021-09-26 PROCEDURE — 85730 THROMBOPLASTIN TIME PARTIAL: CPT | Performed by: REGISTERED NURSE

## 2021-09-26 PROCEDURE — 10D17Z9 MANUAL EXTRACTION OF PRODUCTS OF CONCEPTION, RETAINED, VIA NATURAL OR ARTIFICIAL OPENING: ICD-10-PCS | Performed by: REGISTERED NURSE

## 2021-09-26 PROCEDURE — 85384 FIBRINOGEN ACTIVITY: CPT | Performed by: REGISTERED NURSE

## 2021-09-26 PROCEDURE — 85027 COMPLETE CBC AUTOMATED: CPT | Performed by: REGISTERED NURSE

## 2021-09-26 PROCEDURE — 3E0R3BZ INTRODUCTION OF ANESTHETIC AGENT INTO SPINAL CANAL, PERCUTANEOUS APPROACH: ICD-10-PCS | Performed by: REGISTERED NURSE

## 2021-09-26 PROCEDURE — 86923 COMPATIBILITY TEST ELECTRIC: CPT | Performed by: REGISTERED NURSE

## 2021-09-26 PROCEDURE — 722N000001 HC LABOR CARE VAGINAL DELIVERY SINGLE

## 2021-09-26 PROCEDURE — 82330 ASSAY OF CALCIUM: CPT | Performed by: REGISTERED NURSE

## 2021-09-26 PROCEDURE — 85610 PROTHROMBIN TIME: CPT | Performed by: REGISTERED NURSE

## 2021-09-26 PROCEDURE — 120N000001 HC R&B MED SURG/OB

## 2021-09-26 PROCEDURE — 84132 ASSAY OF SERUM POTASSIUM: CPT | Performed by: REGISTERED NURSE

## 2021-09-26 PROCEDURE — 250N000013 HC RX MED GY IP 250 OP 250 PS 637: Performed by: REGISTERED NURSE

## 2021-09-26 RX ORDER — NALOXONE HYDROCHLORIDE 0.4 MG/ML
0.2 INJECTION, SOLUTION INTRAMUSCULAR; INTRAVENOUS; SUBCUTANEOUS
Status: DISCONTINUED | OUTPATIENT
Start: 2021-09-26 | End: 2021-09-26 | Stop reason: HOSPADM

## 2021-09-26 RX ORDER — NICOTINE POLACRILEX 4 MG
15-30 LOZENGE BUCCAL
Status: DISCONTINUED | OUTPATIENT
Start: 2021-09-26 | End: 2021-09-26 | Stop reason: HOSPADM

## 2021-09-26 RX ORDER — MISOPROSTOL 200 UG/1
800 TABLET ORAL
Status: DISCONTINUED | OUTPATIENT
Start: 2021-09-26 | End: 2021-09-26 | Stop reason: HOSPADM

## 2021-09-26 RX ORDER — ONDANSETRON 4 MG/1
4 TABLET, ORALLY DISINTEGRATING ORAL EVERY 6 HOURS PRN
Status: DISCONTINUED | OUTPATIENT
Start: 2021-09-26 | End: 2021-09-26 | Stop reason: HOSPADM

## 2021-09-26 RX ORDER — KETOROLAC TROMETHAMINE 30 MG/ML
30 INJECTION, SOLUTION INTRAMUSCULAR; INTRAVENOUS
Status: DISCONTINUED | OUTPATIENT
Start: 2021-09-26 | End: 2021-09-28 | Stop reason: HOSPADM

## 2021-09-26 RX ORDER — PROCHLORPERAZINE 25 MG
25 SUPPOSITORY, RECTAL RECTAL EVERY 12 HOURS PRN
Status: DISCONTINUED | OUTPATIENT
Start: 2021-09-26 | End: 2021-09-26 | Stop reason: HOSPADM

## 2021-09-26 RX ORDER — NALOXONE HYDROCHLORIDE 0.4 MG/ML
0.4 INJECTION, SOLUTION INTRAMUSCULAR; INTRAVENOUS; SUBCUTANEOUS
Status: DISCONTINUED | OUTPATIENT
Start: 2021-09-26 | End: 2021-09-26

## 2021-09-26 RX ORDER — OXYTOCIN/0.9 % SODIUM CHLORIDE 30/500 ML
340 PLASTIC BAG, INJECTION (ML) INTRAVENOUS CONTINUOUS PRN
Status: DISCONTINUED | OUTPATIENT
Start: 2021-09-26 | End: 2021-09-26 | Stop reason: HOSPADM

## 2021-09-26 RX ORDER — CARBOPROST TROMETHAMINE 250 UG/ML
250 INJECTION, SOLUTION INTRAMUSCULAR
Status: DISCONTINUED | OUTPATIENT
Start: 2021-09-26 | End: 2021-09-26 | Stop reason: HOSPADM

## 2021-09-26 RX ORDER — OXYTOCIN 10 [USP'U]/ML
10 INJECTION, SOLUTION INTRAMUSCULAR; INTRAVENOUS
Status: DISCONTINUED | OUTPATIENT
Start: 2021-09-26 | End: 2021-09-26

## 2021-09-26 RX ORDER — OXYTOCIN 10 [USP'U]/ML
10 INJECTION, SOLUTION INTRAMUSCULAR; INTRAVENOUS
Status: DISCONTINUED | OUTPATIENT
Start: 2021-09-26 | End: 2021-09-28 | Stop reason: HOSPADM

## 2021-09-26 RX ORDER — NICOTINE POLACRILEX 4 MG
15-30 LOZENGE BUCCAL
Status: DISCONTINUED | OUTPATIENT
Start: 2021-09-26 | End: 2021-09-28 | Stop reason: CLARIF

## 2021-09-26 RX ORDER — DOCUSATE SODIUM 100 MG/1
100 CAPSULE, LIQUID FILLED ORAL DAILY
Status: DISCONTINUED | OUTPATIENT
Start: 2021-09-26 | End: 2021-09-28 | Stop reason: HOSPADM

## 2021-09-26 RX ORDER — DEXTROSE MONOHYDRATE 25 G/50ML
25-50 INJECTION, SOLUTION INTRAVENOUS
Status: DISCONTINUED | OUTPATIENT
Start: 2021-09-26 | End: 2021-09-26 | Stop reason: HOSPADM

## 2021-09-26 RX ORDER — SODIUM CHLORIDE, SODIUM LACTATE, POTASSIUM CHLORIDE, CALCIUM CHLORIDE 600; 310; 30; 20 MG/100ML; MG/100ML; MG/100ML; MG/100ML
INJECTION, SOLUTION INTRAVENOUS CONTINUOUS
Status: DISCONTINUED | OUTPATIENT
Start: 2021-09-26 | End: 2021-09-26 | Stop reason: HOSPADM

## 2021-09-26 RX ORDER — PROCHLORPERAZINE 25 MG
25 SUPPOSITORY, RECTAL RECTAL EVERY 12 HOURS PRN
Status: DISCONTINUED | OUTPATIENT
Start: 2021-09-26 | End: 2021-09-26

## 2021-09-26 RX ORDER — OXYTOCIN/0.9 % SODIUM CHLORIDE 30/500 ML
100-340 PLASTIC BAG, INJECTION (ML) INTRAVENOUS CONTINUOUS PRN
Status: DISCONTINUED | OUTPATIENT
Start: 2021-09-26 | End: 2021-09-26

## 2021-09-26 RX ORDER — ONDANSETRON 4 MG/1
4 TABLET, ORALLY DISINTEGRATING ORAL EVERY 6 HOURS PRN
Status: DISCONTINUED | OUTPATIENT
Start: 2021-09-26 | End: 2021-09-26

## 2021-09-26 RX ORDER — BUPIVACAINE HYDROCHLORIDE 2.5 MG/ML
INJECTION, SOLUTION EPIDURAL; INFILTRATION; INTRACAUDAL PRN
Status: DISCONTINUED | OUTPATIENT
Start: 2021-09-26 | End: 2021-09-26

## 2021-09-26 RX ORDER — KETOROLAC TROMETHAMINE 30 MG/ML
30 INJECTION, SOLUTION INTRAMUSCULAR; INTRAVENOUS
Status: DISCONTINUED | OUTPATIENT
Start: 2021-09-26 | End: 2021-09-26

## 2021-09-26 RX ORDER — MODIFIED LANOLIN
OINTMENT (GRAM) TOPICAL
Status: DISCONTINUED | OUTPATIENT
Start: 2021-09-26 | End: 2021-09-28 | Stop reason: HOSPADM

## 2021-09-26 RX ORDER — ONDANSETRON 2 MG/ML
4 INJECTION INTRAMUSCULAR; INTRAVENOUS EVERY 6 HOURS PRN
Status: DISCONTINUED | OUTPATIENT
Start: 2021-09-26 | End: 2021-09-26

## 2021-09-26 RX ORDER — PROCHLORPERAZINE MALEATE 10 MG
10 TABLET ORAL EVERY 6 HOURS PRN
Status: DISCONTINUED | OUTPATIENT
Start: 2021-09-26 | End: 2021-09-26 | Stop reason: HOSPADM

## 2021-09-26 RX ORDER — METHYLERGONOVINE MALEATE 0.2 MG/ML
200 INJECTION INTRAVENOUS
Status: DISCONTINUED | OUTPATIENT
Start: 2021-09-26 | End: 2021-09-28 | Stop reason: HOSPADM

## 2021-09-26 RX ORDER — IBUPROFEN 600 MG/1
600 TABLET, FILM COATED ORAL
Status: DISCONTINUED | OUTPATIENT
Start: 2021-09-26 | End: 2021-09-28 | Stop reason: HOSPADM

## 2021-09-26 RX ORDER — OXYTOCIN/0.9 % SODIUM CHLORIDE 30/500 ML
1-24 PLASTIC BAG, INJECTION (ML) INTRAVENOUS CONTINUOUS
Status: DISCONTINUED | OUTPATIENT
Start: 2021-09-26 | End: 2021-09-26 | Stop reason: HOSPADM

## 2021-09-26 RX ORDER — OXYTOCIN/0.9 % SODIUM CHLORIDE 30/500 ML
340 PLASTIC BAG, INJECTION (ML) INTRAVENOUS CONTINUOUS PRN
Status: DISCONTINUED | OUTPATIENT
Start: 2021-09-26 | End: 2021-09-26

## 2021-09-26 RX ORDER — ACETAMINOPHEN 325 MG/1
650 TABLET ORAL EVERY 4 HOURS PRN
Status: DISCONTINUED | OUTPATIENT
Start: 2021-09-26 | End: 2021-09-28 | Stop reason: HOSPADM

## 2021-09-26 RX ORDER — NALOXONE HYDROCHLORIDE 0.4 MG/ML
0.2 INJECTION, SOLUTION INTRAMUSCULAR; INTRAVENOUS; SUBCUTANEOUS
Status: DISCONTINUED | OUTPATIENT
Start: 2021-09-26 | End: 2021-09-26

## 2021-09-26 RX ORDER — MISOPROSTOL 200 UG/1
400 TABLET ORAL
Status: DISCONTINUED | OUTPATIENT
Start: 2021-09-26 | End: 2021-09-26

## 2021-09-26 RX ORDER — METHYLERGONOVINE MALEATE 0.2 MG/ML
200 INJECTION INTRAVENOUS
Status: DISCONTINUED | OUTPATIENT
Start: 2021-09-26 | End: 2021-09-26 | Stop reason: HOSPADM

## 2021-09-26 RX ORDER — HYDROCORTISONE 2.5 %
CREAM (GRAM) TOPICAL 3 TIMES DAILY PRN
Status: DISCONTINUED | OUTPATIENT
Start: 2021-09-26 | End: 2021-09-28 | Stop reason: HOSPADM

## 2021-09-26 RX ORDER — NALOXONE HYDROCHLORIDE 0.4 MG/ML
0.4 INJECTION, SOLUTION INTRAMUSCULAR; INTRAVENOUS; SUBCUTANEOUS
Status: DISCONTINUED | OUTPATIENT
Start: 2021-09-26 | End: 2021-09-26 | Stop reason: HOSPADM

## 2021-09-26 RX ORDER — BISACODYL 10 MG
10 SUPPOSITORY, RECTAL RECTAL DAILY PRN
Status: DISCONTINUED | OUTPATIENT
Start: 2021-09-26 | End: 2021-09-28 | Stop reason: HOSPADM

## 2021-09-26 RX ORDER — CARBOPROST TROMETHAMINE 250 UG/ML
250 INJECTION, SOLUTION INTRAMUSCULAR
Status: DISCONTINUED | OUTPATIENT
Start: 2021-09-26 | End: 2021-09-26

## 2021-09-26 RX ORDER — LIDOCAINE 40 MG/G
CREAM TOPICAL
Status: DISCONTINUED | OUTPATIENT
Start: 2021-09-26 | End: 2021-09-26

## 2021-09-26 RX ORDER — OXYCODONE HYDROCHLORIDE 5 MG/1
5 TABLET ORAL EVERY 4 HOURS PRN
Status: DISCONTINUED | OUTPATIENT
Start: 2021-09-26 | End: 2021-09-28 | Stop reason: HOSPADM

## 2021-09-26 RX ORDER — METHYLERGONOVINE MALEATE 0.2 MG/ML
200 INJECTION INTRAVENOUS
Status: DISCONTINUED | OUTPATIENT
Start: 2021-09-26 | End: 2021-09-26

## 2021-09-26 RX ORDER — METOCLOPRAMIDE 10 MG/1
10 TABLET ORAL EVERY 6 HOURS PRN
Status: DISCONTINUED | OUTPATIENT
Start: 2021-09-26 | End: 2021-09-26

## 2021-09-26 RX ORDER — METOCLOPRAMIDE HYDROCHLORIDE 5 MG/ML
10 INJECTION INTRAMUSCULAR; INTRAVENOUS EVERY 6 HOURS PRN
Status: DISCONTINUED | OUTPATIENT
Start: 2021-09-26 | End: 2021-09-26

## 2021-09-26 RX ORDER — SODIUM CHLORIDE, SODIUM LACTATE, POTASSIUM CHLORIDE, CALCIUM CHLORIDE 600; 310; 30; 20 MG/100ML; MG/100ML; MG/100ML; MG/100ML
INJECTION, SOLUTION INTRAVENOUS CONTINUOUS
Status: DISCONTINUED | OUTPATIENT
Start: 2021-09-26 | End: 2021-09-28 | Stop reason: HOSPADM

## 2021-09-26 RX ORDER — IBUPROFEN 600 MG/1
600 TABLET, FILM COATED ORAL
Status: DISCONTINUED | OUTPATIENT
Start: 2021-09-26 | End: 2021-09-26

## 2021-09-26 RX ORDER — SODIUM CHLORIDE 9 MG/ML
INJECTION, SOLUTION INTRAVENOUS CONTINUOUS
Status: DISCONTINUED | OUTPATIENT
Start: 2021-09-26 | End: 2021-09-26 | Stop reason: HOSPADM

## 2021-09-26 RX ORDER — LIDOCAINE HYDROCHLORIDE AND EPINEPHRINE 15; 5 MG/ML; UG/ML
INJECTION, SOLUTION EPIDURAL PRN
Status: DISCONTINUED | OUTPATIENT
Start: 2021-09-26 | End: 2021-09-26

## 2021-09-26 RX ORDER — MISOPROSTOL 200 UG/1
400 TABLET ORAL
Status: DISCONTINUED | OUTPATIENT
Start: 2021-09-26 | End: 2021-09-28 | Stop reason: HOSPADM

## 2021-09-26 RX ORDER — CARBOPROST TROMETHAMINE 250 UG/ML
250 INJECTION, SOLUTION INTRAMUSCULAR
Status: DISCONTINUED | OUTPATIENT
Start: 2021-09-26 | End: 2021-09-28 | Stop reason: HOSPADM

## 2021-09-26 RX ORDER — LIDOCAINE 40 MG/G
CREAM TOPICAL
Status: DISCONTINUED | OUTPATIENT
Start: 2021-09-26 | End: 2021-09-26 | Stop reason: HOSPADM

## 2021-09-26 RX ORDER — MISOPROSTOL 200 UG/1
800 TABLET ORAL
Status: DISCONTINUED | OUTPATIENT
Start: 2021-09-26 | End: 2021-09-26

## 2021-09-26 RX ORDER — METOCLOPRAMIDE 10 MG/1
10 TABLET ORAL EVERY 6 HOURS PRN
Status: DISCONTINUED | OUTPATIENT
Start: 2021-09-26 | End: 2021-09-26 | Stop reason: HOSPADM

## 2021-09-26 RX ORDER — EPHEDRINE SULFATE 50 MG/ML
5 INJECTION, SOLUTION INTRAMUSCULAR; INTRAVENOUS; SUBCUTANEOUS
Status: DISCONTINUED | OUTPATIENT
Start: 2021-09-26 | End: 2021-09-26 | Stop reason: HOSPADM

## 2021-09-26 RX ORDER — MISOPROSTOL 200 UG/1
800 TABLET ORAL
Status: DISCONTINUED | OUTPATIENT
Start: 2021-09-26 | End: 2021-09-28 | Stop reason: HOSPADM

## 2021-09-26 RX ORDER — IBUPROFEN 800 MG/1
800 TABLET, FILM COATED ORAL EVERY 6 HOURS PRN
Status: DISCONTINUED | OUTPATIENT
Start: 2021-09-26 | End: 2021-09-28 | Stop reason: HOSPADM

## 2021-09-26 RX ORDER — PROCHLORPERAZINE MALEATE 10 MG
10 TABLET ORAL EVERY 6 HOURS PRN
Status: DISCONTINUED | OUTPATIENT
Start: 2021-09-26 | End: 2021-09-26

## 2021-09-26 RX ORDER — OXYTOCIN/0.9 % SODIUM CHLORIDE 30/500 ML
340 PLASTIC BAG, INJECTION (ML) INTRAVENOUS CONTINUOUS PRN
Status: DISCONTINUED | OUTPATIENT
Start: 2021-09-26 | End: 2021-09-28 | Stop reason: HOSPADM

## 2021-09-26 RX ORDER — NALBUPHINE HYDROCHLORIDE 10 MG/ML
2.5-5 INJECTION, SOLUTION INTRAMUSCULAR; INTRAVENOUS; SUBCUTANEOUS EVERY 6 HOURS PRN
Status: DISCONTINUED | OUTPATIENT
Start: 2021-09-26 | End: 2021-09-28 | Stop reason: HOSPADM

## 2021-09-26 RX ORDER — OXYTOCIN 10 [USP'U]/ML
10 INJECTION, SOLUTION INTRAMUSCULAR; INTRAVENOUS
Status: DISCONTINUED | OUTPATIENT
Start: 2021-09-26 | End: 2021-09-26 | Stop reason: HOSPADM

## 2021-09-26 RX ORDER — DEXTROSE, SODIUM CHLORIDE, SODIUM LACTATE, POTASSIUM CHLORIDE, AND CALCIUM CHLORIDE 5; .6; .31; .03; .02 G/100ML; G/100ML; G/100ML; G/100ML; G/100ML
INJECTION, SOLUTION INTRAVENOUS CONTINUOUS
Status: DISCONTINUED | OUTPATIENT
Start: 2021-09-26 | End: 2021-09-26 | Stop reason: HOSPADM

## 2021-09-26 RX ORDER — DEXTROSE MONOHYDRATE 25 G/50ML
25-50 INJECTION, SOLUTION INTRAVENOUS
Status: DISCONTINUED | OUTPATIENT
Start: 2021-09-26 | End: 2021-09-28 | Stop reason: CLARIF

## 2021-09-26 RX ORDER — CEFAZOLIN SODIUM 2 G/100ML
2 INJECTION, SOLUTION INTRAVENOUS
Status: COMPLETED | OUTPATIENT
Start: 2021-09-26 | End: 2021-09-26

## 2021-09-26 RX ORDER — ONDANSETRON 2 MG/ML
4 INJECTION INTRAMUSCULAR; INTRAVENOUS EVERY 6 HOURS PRN
Status: DISCONTINUED | OUTPATIENT
Start: 2021-09-26 | End: 2021-09-26 | Stop reason: HOSPADM

## 2021-09-26 RX ADMIN — SODIUM CHLORIDE, POTASSIUM CHLORIDE, SODIUM LACTATE AND CALCIUM CHLORIDE: 600; 310; 30; 20 INJECTION, SOLUTION INTRAVENOUS at 18:15

## 2021-09-26 RX ADMIN — SODIUM CHLORIDE, SODIUM LACTATE, POTASSIUM CHLORIDE, CALCIUM CHLORIDE AND DEXTROSE MONOHYDRATE: 5; 600; 310; 30; 20 INJECTION, SOLUTION INTRAVENOUS at 13:33

## 2021-09-26 RX ADMIN — METHYLERGONOVINE MALEATE 200 MCG: 0.2 INJECTION, SOLUTION INTRAMUSCULAR; INTRAVENOUS at 17:26

## 2021-09-26 RX ADMIN — CEFAZOLIN SODIUM 2 G: 2 INJECTION, SOLUTION INTRAVENOUS at 18:24

## 2021-09-26 RX ADMIN — BUPIVACAINE HYDROCHLORIDE 3 ML: 2.5 INJECTION, SOLUTION EPIDURAL; INFILTRATION; INTRACAUDAL at 09:47

## 2021-09-26 RX ADMIN — Medication: at 09:43

## 2021-09-26 RX ADMIN — Medication 2 MILLI-UNITS/MIN: at 13:01

## 2021-09-26 RX ADMIN — LIDOCAINE HYDROCHLORIDE,EPINEPHRINE BITARTRATE 3 ML: 15; .005 INJECTION, SOLUTION EPIDURAL; INFILTRATION; INTRACAUDAL; PERINEURAL at 09:43

## 2021-09-26 RX ADMIN — ACETAMINOPHEN 650 MG: 325 TABLET ORAL at 20:50

## 2021-09-26 RX ADMIN — IBUPROFEN 600 MG: 600 TABLET ORAL at 20:50

## 2021-09-26 RX ADMIN — LIDOCAINE HYDROCHLORIDE,EPINEPHRINE BITARTRATE 3 ML: 15; .005 INJECTION, SOLUTION EPIDURAL; INFILTRATION; INTRACAUDAL; PERINEURAL at 09:46

## 2021-09-26 RX ADMIN — TRANEXAMIC ACID 1 G: 100 INJECTION, SOLUTION INTRAVENOUS at 16:14

## 2021-09-26 RX ADMIN — INSULIN ASPART 10 UNITS: 100 INJECTION, SOLUTION INTRAVENOUS; SUBCUTANEOUS at 10:05

## 2021-09-26 RX ADMIN — SODIUM CHLORIDE, POTASSIUM CHLORIDE, SODIUM LACTATE AND CALCIUM CHLORIDE: 600; 310; 30; 20 INJECTION, SOLUTION INTRAVENOUS at 17:15

## 2021-09-26 RX ADMIN — SODIUM CHLORIDE, POTASSIUM CHLORIDE, SODIUM LACTATE AND CALCIUM CHLORIDE 1000 ML: 600; 310; 30; 20 INJECTION, SOLUTION INTRAVENOUS at 09:37

## 2021-09-26 RX ADMIN — SODIUM CHLORIDE, POTASSIUM CHLORIDE, SODIUM LACTATE AND CALCIUM CHLORIDE 1000 ML: 600; 310; 30; 20 INJECTION, SOLUTION INTRAVENOUS at 08:46

## 2021-09-26 RX ADMIN — MISOPROSTOL 800 MCG: 200 TABLET ORAL at 17:19

## 2021-09-26 RX ADMIN — Medication 10 MG: at 17:18

## 2021-09-26 ASSESSMENT — MIFFLIN-ST. JEOR: SCORE: 1380.25

## 2021-09-26 NOTE — PROGRESS NOTES
"Patient Name:  Se Kaur  :      1975  MRN:      [id number]    Assessment:     at 37w6d  Early labor  Category 1 FHTs  SROM, clear      Plan:   -Continue with antepartum diabetic management until active labor.   -Discussed pitocin augmentation with next exam if no significant cervical change.   -Routine support & management. Use peanut ball when able. Encourage position changes, ambulation, rest as desired.  -Anticipate progress and NSVB.   -Reevaluate progress in 4 hours or sooner with a change in status.     Subjective:  Se Kaur is coping well and was able to sleep following her epidural placement. Good PO fluid intake.   Voiding without issue.  at home with their children, will attempt to make it for the birth.      Objective:  /60   Pulse 75   Temp 98.2  F (36.8  C)   Resp 16   Ht 1.549 m (5' 1\")   Wt 80.3 kg (177 lb)   LMP 2021   SpO2 96%   BMI 33.44 kg/m      FHR:Baseline: 125 bpm, Variability: Moderate (6 - 25 bpm), Accelerations: present and Decelerations: Absent    Uterine contractions:TocoFrequency: Every 3-4 minutes, Duration: 60-90 seconds and Intensity: moderate    SVE:4/80/-2    Provider:  Irma Dean CNM      Date:  2021  Time:  11:43 AM    "

## 2021-09-26 NOTE — ANESTHESIA PREPROCEDURE EVALUATION
Anesthesia Pre-Procedure Evaluation    Patient: Se Kaur   MRN: [id number] : 1975        Preoperative Diagnosis: * No surgery found *   Procedure :      Past Medical History:   Diagnosis Date     Diabetes (H)     gestational diabetes      Past Surgical History:   Procedure Laterality Date     GYN SURGERY      C/S 20 months ago for twin gestation      No Known Allergies   Social History     Tobacco Use     Smoking status: Never Smoker     Smokeless tobacco: Never Used   Substance Use Topics     Alcohol use: Not Currently      Wt Readings from Last 1 Encounters:   21 80.3 kg (177 lb)        Anesthesia Evaluation   Pt has had prior anesthetic. Type: OB Labor Epidural.    No history of anesthetic complications       ROS/MED HX  ENT/Pulmonary:  - neg pulmonary ROS     Neurologic:       Cardiovascular:  - neg cardiovascular ROS     METS/Exercise Tolerance:     Hematologic:  - neg hematologic  ROS     Musculoskeletal:       GI/Hepatic:  - neg GI/hepatic ROS     Renal/Genitourinary:       Endo:     (+) type I DM, Obesity,     Psychiatric/Substance Use:  - neg psychiatric ROS     Infectious Disease:       Malignancy:       Other:      (+) , previous ,         Physical Exam    Airway        Mallampati: II   TM distance: > 3 FB      Respiratory Devices and Support         Dental  no notable dental history         Cardiovascular   cardiovascular exam normal          Pulmonary   pulmonary exam normal                OUTSIDE LABS:  CBC:   Lab Results   Component Value Date    WBC 6.4 2021    HGB 8.7 (L) 2021    HCT 30.3 (L) 2021     2021     BMP:   Lab Results   Component Value Date     (H) 2021     COAGS: No results found for: PTT, INR, FIBR  POC: No results found for: BGM, HCG, HCGS  HEPATIC: No results found for: ALBUMIN, PROTTOTAL, ALT, AST, GGT, ALKPHOS, BILITOTAL, BILIDIRECT, VERO  OTHER: No results found for: PH, LACT, A1C, LUKE, PHOS, MAG, LIPASE, AMYLASE,  TSH, T4, T3, CRP, SED    Anesthesia Plan    ASA Status:  2      Anesthesia Type: Epidural.              Consents    Anesthesia Plan(s) and associated risks, benefits, and realistic alternatives discussed. Questions answered and patient/representative(s) expressed understanding.     - Discussed with:  Patient         Postoperative Care            Comments:                Goran Hawk MD

## 2021-09-26 NOTE — PROGRESS NOTES
Orders for pitocin augmentation given contraction pattern change to every 4-6 minutes. She is agreeable to this intervention. Given NPO with epidural and , orders entered for active labor order set with D5 and insulin infusion per Dr. Atwood.      Continue with maternal position changes to optimize fetal heart rate.

## 2021-09-26 NOTE — L&D DELIVERY NOTE
Vaginal Delivery Note    Name: Se Kaur  : 1975  MRN: [id number]    PRE DELIVERY DIAGNOSIS  1) 46 year old  3 Para  at 37w6d      2) H/o LTCS  3) Class B diabetic  4) History of gHTN  5) History of PP depression  6) AMA-age 46  7) Short interval pregnancy  8) Anemia    POST DELIVERY DIAGNOSIS  1) 46 year old  @ 37w6d  2) Delivery of a viable infant weighing 7lb8oz   via    3) PPH      Augmentation Yes              Indication: Ineffective contraction pattern  Induction No                      Indication: N/A    Monitors: External     GBS: Negative    ROM: SROM  Fluid Type: Clear    Labor Analgesia/Anesthesia:Epidural    Cord pH obtained: No  Placenta submitted to Pathology: Yes: PPH and manual removal    Description of procedure:   46 year old  with PNC w/ MWC and pregnancy complicated by the above pre-diagnosis list who presented to L&D with spontaneous rupture of membranes and labor contractions.  Her hospital course was uncomplicated.  She never required an insulin infusion in labor, BS remained 70-80. Patient progressed to complete with spontaneous rupture of membranes and pitocin.   Shoulder Dystocia No  Operative Vaginal Delivery No  Infant spontaneously delivered over a small 1st  degree laceration that was hemostatic. No repair.   Infant delivered in HUI position.  Nuchal cord No   Placenta partially delivered and then required manual removal at: 2021  5:26 PM  grossly intact with 3 vessel cord.  Infant:  Live, vigorous infant  was handed to mom.    Delivery was complicated by retained placenta and postpartum hemorrhage Interventions included fundal massage, pitocin, methergine, TXA and manual extraction of placenta.  Immediate postpartum timeline:   At 20 min PP placenta remained partially detatched. On-call MD updated. Pitocin was running and TXA had been given. At 30 min PP Se began to fell nauseous and fatigued, BP dropped to 60-70/40-50. In and out of  consciousness. Bolus started, in-house OB called, cytotec 800mg MN, massive transfusion protocol and rapid response called. See Dr. Dc's note for delivery of placenta.  Continued fatigue and nausea in the post delivery period, PP hgb 6.6. Will transfuse 1U PRBC and recheck hgb in 4 hours. Dr. Atwood aware of orders.     Delivery QBL (mL):1600    Mother and Infant stable.    Dr. Atwood remained available for consultation and collaboration as needed. She was paged at 20min PP and was in route to WW when care level was escalated and in-house OB was called to bedside PP.       Irma Dean CNM    9/26/2021 5:49 PM

## 2021-09-26 NOTE — CONSULTS
In House OB    Called into room stat for retained placenta, PPH, and unresponsiveness of the patient. When en-route, a rapid response was called.    Upon my arrival, an SBAR was given. I was given information regarding labor and delivery. QBL was about 1200 ml. Placenta was almost delivered. I reached in and took the placenta out completely. No retained placenta noted. The uterine scar from previous C/S was intact. The uterus became tonic immediately. The stage II PPH protocol was activated. Labs were drawn. She received Methergine with my order. Cytotec had already been given. Pitocin continues to be running. The rapid response team was relieved of their duties. I left the room at this time.     Praveen Dc M.D.

## 2021-09-26 NOTE — ANESTHESIA PROCEDURE NOTES
Epidural catheter Procedure Note    Pre-Procedure   Staff -        Anesthesiologist:  Goran Hawk MD       Performed By: anesthesiologist       Location: OB       Procedure Start/Stop Times: 9/26/2021 9:31 AM and 9/26/2021 9:49 AM       Pre-Anesthestic Checklist: patient identified, IV checked, site marked, risks and benefits discussed, informed consent, monitors and equipment checked and pre-op evaluation  Timeout:       Correct Patient: Yes        Correct Procedure: Yes        Correct Site: Yes        Correct Position: Yes   Procedure Documentation  Procedure: epidural catheter       Patient Position: sitting       Skin prep: Chloraprep       Local skin infiltrated with 3 mL of 2% lidocaine.        Insertion Site: L3-4. (midline approach).       Technique: LORT saline        CRUZ at 5 cm.       Needle Type: Touhy needle       Needle Gauge: 17.        Needle Length (Inches): 3.5        Catheter: 19 G.         Catheter threaded easily.         Threaded 10 cm at skin.        # of attempts: 1 and  # of redirects:     Assessment/Narrative         Paresthesias: No.      Test dose of 3 mL lidocaine 1.5% w/ 1:200,000 epinephrine at.         Test dose negative, 3 minutes after injection, for signs of intravascular, subdural, or intrathecal injection.       Insertion/Infusion Method: LORT saline       Aspiration negative for Heme or CSF via Epidural Catheter.     Comments:  Risks, benefits, and procedure discussed with Pt and there was agreement to proceed.  Site prepped and draped, sterile technique.  17 Ga Tuohy advanced to positive CRUZ with saline syringe.  Catheter advanced without resistance.  Aspiration negative for blood/CSF.  Test dose negative.  No complications.  Pt tolerated the procedure well.

## 2021-09-26 NOTE — H&P
"HISTORY AND PHYSICAL UPDATE ADMISSION EXAM    Name: Se Kaur  YOB: 1975  Medical Record Number: [id number]    History of Present Illness: Se Kaur is a 46 year old female who is 37w6d pregnant and being admitted for SROM.    Estimated Date of Delivery: Oct 11, 2021    EGA 37w6d    OB History    Para Term  AB Living   3 2 2 0 0 3   SAB TAB Ectopic Multiple Live Births   0 0 0 1 3      # Outcome Date GA Lbr Tano/2nd Weight Sex Delivery Anes PTL Lv   3 Current            2A Term         MELECIO   2B Term         MELECIO   1 Term         MELECIO        Lab Results   Component Value Date    HGB 8.7 (L) 2021       Prenatal Complications:     H/o LTCS  Class B diabetic  History of gHTN  History of PP depression  AMA-age 46  Short interval pregnancy  Anemia    Exam:      /81   Pulse 75   Temp 98.2  F (36.8  C)   Resp 20   Ht 1.549 m (5' 1\")   Wt 80.3 kg (177 lb)   LMP 2021   BMI 33.44 kg/m      Fetal heart Rate Category 1 FHR, baseline 120, moderate variability, accelerations present, decelerations absent  Contractions 4-6    HEENT grossly normal  Neck: no lymphadenopathy or thryoidomegaly  Lungs CTAB  Heart RRR  ABD gravid, non-tender  EXT:  +1 edema, moves freely  Vaginal exam 3-4/70/-2  Membranes: SROM    Assessment: SROM and early labor  GBS negative  Class B diabetic on insulin  TOLAC      Plan: Admit - see IP orders  Pain medication may have epidural  MD consultant on call Dr. Atwood/ available prn. Per Dr. Atwood: plan morning novolog, admission BS and transition to active labor diabetic protocol as needed  Anticipate   Active management of the third stage. TXA prior to delivery.   Prenatal record reviewed.    Irma Dean CNM      2021   8:43 AM  "

## 2021-09-27 LAB
GLUCOSE BLDC GLUCOMTR-MCNC: 130 MG/DL (ref 70–99)
GLUCOSE BLDC GLUCOMTR-MCNC: 162 MG/DL (ref 70–99)
GLUCOSE BLDC GLUCOMTR-MCNC: 75 MG/DL (ref 70–99)
HGB BLD-MCNC: 6.6 G/DL (ref 11.7–15.7)
HGB BLD-MCNC: 7.1 G/DL (ref 11.7–15.7)
T PALLIDUM AB SER QL: NEGATIVE

## 2021-09-27 PROCEDURE — 85018 HEMOGLOBIN: CPT | Performed by: ADVANCED PRACTICE MIDWIFE

## 2021-09-27 PROCEDURE — 36415 COLL VENOUS BLD VENIPUNCTURE: CPT | Performed by: ADVANCED PRACTICE MIDWIFE

## 2021-09-27 PROCEDURE — P9016 RBC LEUKOCYTES REDUCED: HCPCS | Performed by: REGISTERED NURSE

## 2021-09-27 PROCEDURE — 120N000001 HC R&B MED SURG/OB

## 2021-09-27 PROCEDURE — 85018 HEMOGLOBIN: CPT | Performed by: REGISTERED NURSE

## 2021-09-27 PROCEDURE — 36415 COLL VENOUS BLD VENIPUNCTURE: CPT | Performed by: REGISTERED NURSE

## 2021-09-27 RX ORDER — FERROUS GLUCONATE 324(38)MG
324 TABLET ORAL EVERY EVENING
COMMUNITY

## 2021-09-27 RX ORDER — ASPIRIN 81 MG/1
81 TABLET ORAL EVERY EVENING
Status: ON HOLD | COMMUNITY
End: 2021-09-28

## 2021-09-27 RX ORDER — VITAMIN A ACETATE, .BETA.-CAROTENE, ASCORBIC ACID, CHOLECALCIFEROL, .ALPHA.-TOCOPHEROL ACETATE, DL-, THIAMINE MONONITRATE, RIBOFLAVIN, NIACINAMIDE, PYRIDOXINE HYDROCHLORIDE, FOLIC ACID, CYANOCOBALAMIN, CALCIUM CARBONATE, FERROUS FUMARATE, ZINC OXIDE, AND CUPRIC OXIDE 2000; 2000; 120; 400; 22; 1.84; 3; 20; 10; 1; 12; 200; 27; 25; 2 [IU]/1; [IU]/1; MG/1; [IU]/1; MG/1; MG/1; MG/1; MG/1; MG/1; MG/1; UG/1; MG/1; MG/1; MG/1; MG/1
1 TABLET ORAL EVERY EVENING
COMMUNITY
End: 2024-01-25

## 2021-09-27 RX ORDER — CHOLECALCIFEROL (VITAMIN D3) 50 MCG
1 TABLET ORAL EVERY EVENING
COMMUNITY

## 2021-09-27 NOTE — PLAN OF CARE
Problem: Adult Inpatient Plan of Care  Goal: Plan of Care Review  Outcome: Improving   VSS. Pain is being managed with tylenol and ibuprofen. Stood at bedside. Araujo catheter still in. Blood glusoce of 75 this AM, apple juice was given. Hgb check this AM. Will continue to monitor.

## 2021-09-27 NOTE — PHARMACY-ADMISSION MEDICATION HISTORY
"Pharmacy Note - Admission Medication History    Pertinent Provider Information: *Patient is taking 10u of insulin aspart with meals.      ______________________________________________________________________    Prior To Admission (PTA) med list completed and updated in EMR.       PTA Med List   Medication Sig Last Dose     aspirin 81 MG EC tablet Take 81 mg by mouth every evening 9/25/2021 at Evening     ferrous gluconate (FERGON) 324 (38 Fe) MG tablet Take 324 mg by mouth every evening 9/25/2021 at Evening     insulin aspart U-100 (NOVOLOG FLEXPEN U-100 INSULIN) 100 unit/mL (3 mL) injection pen [INSULIN ASPART U-100 (NOVOLOG FLEXPEN U-100 INSULIN) 100 UNIT/ML (3 ML) INJECTION PEN] Inject 2 Units under the skin 3 (three) times a day before meals. (Patient taking differently: Inject 10 Units Subcutaneous 3 times daily (before meals) ) 9/25/2021 at Dinner     pen needle, diabetic 32 gauge x 1/6\" Ndle [PEN NEEDLE, DIABETIC 32 GAUGE X 1/6\" NDLE] Use 1 each As Directed 3 (three) times a day before meals. 9/25/2021 at Dinner     Prenatal Vit-Fe Fumarate-FA (PNV PRENATAL PLUS MULTIVITAMIN) 27-1 MG TABS per tablet Take 1 tablet by mouth every evening 9/25/2021 at Evening     vitamin D3 (CHOLECALCIFEROL) 50 mcg (2000 units) tablet Take 1 tablet by mouth every evening 9/25/2021 at Evening       Information source(s): Patient and CareEverywhere/[facility]  Method of interview communication: in-person    Summary of Changes to PTA Med List  New: vitamin D, prenatal vitamin, ASA, iron  Discontinued: none  Changed: Novolog    Patient was asked about OTC/herbal products specifically.  PTA med list reflects this.    In the past week, patient estimated taking medication this percent of the time:  greater than 90%.    Allergies were reviewed, assessed, and updated with the patient.      Patient does not use any multi-dose medications prior to admission.    The information provided in this note is only as accurate as the sources " available at the time of the update(s).    Thank you for the opportunity to participate in the care of this patient.    Yanely Simmons  9/27/2021 7:56 AM

## 2021-09-27 NOTE — PROGRESS NOTES
"Vaginal Delivery Postpartum Day 1    Patient Name:  Se Kaur  :      1975  MRN:      [id number]      Assessment: Postpartum s/p postpartum hemorrhage manual removal of placenta    Plan:  1 unit PRBCs  Remove pérez when pt is able to tolerate ambulation    Subjective:  The patient feels well: has catheter in which she would like to have removed asap, lochia normal, tolerating normal diet, and passing flatus.  Pain is well controlled with current medications.  The patient has no emotional concerns.  The baby is well.    Objective:  /61   Pulse 88   Temp 97.9  F (36.6  C) (Oral)   Resp 16   Ht 1.549 m (5' 1\")   Wt 80.3 kg (177 lb)   LMP 2021   SpO2 99%   Breastfeeding Unknown   BMI 33.44 kg/m    Patient Vitals for the past 24 hrs:   BP Temp Temp src Pulse Resp SpO2   21 0245 112/61 97.9  F (36.6  C) Oral 88 16 99 %   21 114/63 -- -- -- -- --   21 -- -- -- -- -- 98 %   21 -- -- -- -- -- 98 %   21 105/56 -- -- -- -- --   21 -- -- -- -- -- 97 %   21 -- -- -- -- -- 97 %   21 114/69 -- -- -- -- 97 %   21 -- -- -- -- -- 99 %   21 -- -- -- -- -- 98 %   21 119/67 -- -- -- -- 99 %   21 -- -- -- -- -- 98 %   21 -- -- -- -- -- 99 %   21 115/63 -- -- -- -- 100 %   21 -- -- -- -- -- 100 %   21 -- -- -- -- -- 99 %   21 103/66 -- -- -- -- 97 %   21 -- -- -- -- -- 100 %   21 111/69 97.9  F (36.6  C) Oral -- -- 99 %   21 -- -- -- -- -- 100 %   21 -- -- -- -- -- 100 %   21 108/71 -- -- -- -- 100 %   21 -- -- -- -- -- 100 %   21 -- -- -- -- -- 100 %   21 113/ -- -- -- -- 100 %   21 -- -- -- -- -- 100 %   21 -- -- -- -- -- 100 %   21 113/ -- -- -- -- 100 %   21 -- -- -- -- -- 100 %   21 " -- -- -- -- -- 100 %   09/26/21 2030 99/61 -- -- -- -- 100 %   09/26/21 2025 -- -- -- -- -- 100 %   09/26/21 2020 -- -- -- -- -- 100 %   09/26/21 2015 103/64 -- -- -- 16 100 %   09/26/21 2010 -- -- -- -- -- 100 %   09/26/21 2005 -- -- -- -- -- 100 %   09/26/21 2000 106/65 -- -- -- 16 90 %   09/26/21 1955 -- -- -- -- -- 99 %   09/26/21 1950 -- -- -- -- -- 100 %   09/26/21 1945 105/70 -- -- -- -- 100 %   09/26/21 1940 -- -- -- -- -- 100 %   09/26/21 1935 -- -- -- -- -- 99 %   09/26/21 1930 101/67 -- -- -- -- 100 %   09/26/21 1925 -- -- -- -- -- 100 %   09/26/21 1920 -- -- -- -- -- 100 %   09/26/21 1915 104/71 -- -- -- 16 100 %   09/26/21 1910 -- -- -- -- -- 100 %   09/26/21 1907 111/73 99.4  F (37.4  C) -- 96 16 --   09/26/21 1900 111/73 -- -- -- 16 100 %   09/26/21 1845 125/77 -- -- -- 16 100 %   09/26/21 1830 125/77 -- -- -- 16 100 %   09/26/21 1825 117/72 -- -- -- 16 100 %   09/26/21 1820 122/77 -- -- -- 16 100 %   09/26/21 1815 117/79 -- -- -- 16 100 %   09/26/21 1810 125/84 -- -- -- 16 99 %   09/26/21 1805 130/79 98.4  F (36.9  C) Oral -- 16 99 %   09/26/21 1800 120/79 -- -- -- 16 100 %   09/26/21 1755 124/78 -- -- -- 16 100 %   09/26/21 1750 123/77 -- -- -- 16 100 %   09/26/21 1745 116/72 -- -- -- 14 100 %   09/26/21 1740 115/75 -- -- -- 14 100 %   09/26/21 1735 121/77 -- -- -- 16 100 %   09/26/21 1730 122/63 -- -- -- 16 100 %   09/26/21 1725 102/61 -- -- -- 18 100 %   09/26/21 1720 (!) 66/48 -- -- -- 18 97 %   09/26/21 1715 (!) 66/40 -- -- -- 18 98 %   09/26/21 1710 -- -- -- -- -- 90 %   09/26/21 1705 -- -- -- -- -- (!) 87 %   09/26/21 1700 107/75 -- -- -- 18 100 %   09/26/21 1530 110/68 98.3  F (36.8  C) Oral -- 16 95 %   09/26/21 1515 -- -- -- -- 16 96 %   09/26/21 1500 121/71 -- -- -- 16 97 %   09/26/21 1445 -- -- -- -- 16 99 %   09/26/21 1430 121/76 -- -- -- 16 99 %   09/26/21 1415 -- -- -- -- 16 95 %   09/26/21 1400 123/80 -- -- -- 16 96 %   09/26/21 1345 -- -- -- -- 16 96 %   09/26/21 1330 123/74 98.1  F  (36.7  C) Oral -- 16 98 %   21 1315 -- -- -- -- 16 100 %   21 1300 131/84 -- -- -- 16 99 %   21 1245 -- -- -- -- 16 96 %   21 1230 129/82 -- -- -- 16 96 %   21 1215 -- -- -- -- 16 95 %   21 1200 121/78 98.2  F (36.8  C) Oral -- 16 96 %   21 1145 -- -- -- -- 16 98 %   21 1130 108/62 -- -- -- 16 95 %   21 1115 -- -- -- -- 16 98 %   21 1100 108/66 -- -- -- 16 95 %   21 1045 -- -- -- -- 16 97 %   21 1030 110/60 -- -- -- 16 96 %   21 1025 109/62 -- -- -- 16 95 %   21 1020 111/66 -- -- -- 16 96 %   21 1015 109/67 -- -- -- 16 96 %   21 1010 108/64 -- -- -- 16 95 %   21 1005 110/68 -- -- -- 16 96 %   21 1000 105/60 -- -- -- 16 97 %   21 0955 107/59 -- -- -- 16 97 %   21 0952 109/67 -- -- -- 16 --   21 0949 110/73 -- -- -- 16 97 %   21 0948 109/73 -- -- -- 16 --   21 0946 106/77 -- -- -- 16 --   21 0944 115/66 -- -- -- 18 98 %   21 0942 135/73 -- -- -- 18 --   21 0939 130/82 -- -- -- 20 98 %       The amount and color of the lochia is appropriate for the duration of recovery.  The uterine fundus is firm.  Urinary output is adequate.     Lab Results   Component Value Date    AS Negative 2021    HGB 6.6 (LL) 2021         There is no immunization history on file for this patient.    Provider:  CASSANDRA Nam CNM    Date:  2021  Time:  9:38 AM    Patient Name:  Se Kaur  :  1975  MRN:  [id number]

## 2021-09-27 NOTE — PROGRESS NOTES
Irma Dean was called and updated on Hgb of 7.1 at 2300 and additional 298 ml QBL from 1358-5375 for a total QBL of 1969 ml. No new orders given. Patient received 1 unit of PRBC at approximately 1900. Updated on patient status. Patient stated that she is feeling much better. VSS. Bleeding is appropriate and fundus is firm @ U without massage. Will continue to monitor.

## 2021-09-27 NOTE — LACTATION NOTE
This note was copied from a baby's chart.  Referred to Se to assist with a feeding. She reported breast feeding her first baby for over a year, the twins were BF for 3 months. Hand expression was reviewed with Se but no colostrum was noted. With Se in a laid back position, a latch was obtained on the R breast. Baby was able to maintain a seal but no swallows were noted at this time. To continue to follow while an inpt.

## 2021-09-27 NOTE — PROVIDER NOTIFICATION
Dr. Nam notified of this morning Hgb result of 6.6.  Also notified that patient's glucose was 75 this morning and that her am insulin was held.  Patient states that she is gestational diabetic and that she  only gets insulin during pregnancy.  Vitals are WDL and she is asymptomatic, denies dizziness at this time.    Veronika Burnette RN

## 2021-09-28 VITALS
OXYGEN SATURATION: 98 % | BODY MASS INDEX: 33.42 KG/M2 | HEART RATE: 95 BPM | TEMPERATURE: 97.7 F | DIASTOLIC BLOOD PRESSURE: 75 MMHG | RESPIRATION RATE: 18 BRPM | SYSTOLIC BLOOD PRESSURE: 137 MMHG | WEIGHT: 177 LBS | HEIGHT: 61 IN

## 2021-09-28 LAB
BLD PROD TYP BPU: NORMAL
BLOOD COMPONENT TYPE: NORMAL
CODING SYSTEM: NORMAL
GLUCOSE BLDC GLUCOMTR-MCNC: 55 MG/DL (ref 70–99)
HGB BLD-MCNC: 7.5 G/DL (ref 11.7–15.7)
PATH REPORT.COMMENTS IMP SPEC: NORMAL
PATH REPORT.FINAL DX SPEC: NORMAL
PATH REPORT.GROSS SPEC: NORMAL
PATH REPORT.MICROSCOPIC SPEC OTHER STN: NORMAL
PATH REPORT.RELEVANT HX SPEC: NORMAL
PHOTO IMAGE: NORMAL
UNIT ABO/RH: NORMAL
UNIT NUMBER: NORMAL
UNIT STATUS: NORMAL
UNIT TYPE ISBT: 8400

## 2021-09-28 PROCEDURE — 88307 TISSUE EXAM BY PATHOLOGIST: CPT | Mod: 26 | Performed by: PATHOLOGY

## 2021-09-28 PROCEDURE — 250N000013 HC RX MED GY IP 250 OP 250 PS 637: Performed by: ADVANCED PRACTICE MIDWIFE

## 2021-09-28 PROCEDURE — 250N000013 HC RX MED GY IP 250 OP 250 PS 637: Performed by: REGISTERED NURSE

## 2021-09-28 PROCEDURE — 36415 COLL VENOUS BLD VENIPUNCTURE: CPT | Performed by: ADVANCED PRACTICE MIDWIFE

## 2021-09-28 PROCEDURE — 85018 HEMOGLOBIN: CPT | Performed by: ADVANCED PRACTICE MIDWIFE

## 2021-09-28 PROCEDURE — C9803 HOPD COVID-19 SPEC COLLECT: HCPCS

## 2021-09-28 RX ORDER — IBUPROFEN 800 MG/1
800 TABLET, FILM COATED ORAL EVERY 6 HOURS PRN
Qty: 30 TABLET | Refills: 0 | Status: SHIPPED | OUTPATIENT
Start: 2021-09-28 | End: 2024-01-25

## 2021-09-28 RX ORDER — DOCUSATE SODIUM 100 MG/1
100 CAPSULE, LIQUID FILLED ORAL DAILY
Qty: 30 CAPSULE | Refills: 0 | Status: SHIPPED | OUTPATIENT
Start: 2021-09-29 | End: 2024-01-25

## 2021-09-28 RX ORDER — FERROUS SULFATE 325(65) MG
325 TABLET ORAL 2 TIMES DAILY
Status: DISCONTINUED | OUTPATIENT
Start: 2021-09-28 | End: 2021-09-28 | Stop reason: HOSPADM

## 2021-09-28 RX ADMIN — DOCUSATE SODIUM 100 MG: 100 CAPSULE, LIQUID FILLED ORAL at 09:30

## 2021-09-28 RX ADMIN — FERROUS SULFATE TAB 325 MG (65 MG ELEMENTAL FE) 325 MG: 325 (65 FE) TAB at 09:39

## 2021-09-28 RX ADMIN — IBUPROFEN 800 MG: 800 TABLET, FILM COATED ORAL at 00:20

## 2021-09-28 NOTE — DISCHARGE INSTRUCTIONS
After a Vaginal Birth  After having a baby, your body may be very tired. It can take time to recover from a vaginal delivery. You may stay in the hospital or birth center from 1 to 4 days. In some cases, you may be able to go home the same day.     Right after the delivery  Your temperature and blood pressure will be taken until they are stable. A nurse or other healthcare provider will watch you as you rest. You may have afterbirth pains. These are cramps caused by the uterus shrinking. Sanitary pads are used to soak up the discharge of the uterine lining. To make sure that you aren t bleeding too much, the pad will be checked. And the firmness of your uterus will be checked. To do this, a nurse will gently push down on your stomach. If you had anesthesia, you ll be watched closely until you can feel and move your toes. If you have pain between your vagina and anus (perineal pain), an ice pack can help.    care  While still in the hospital or birth center, you ll learn how to hold and feed your baby. You will also be given instructions on how to care for your baby. This includes bathing and feeding.    Getting ready to go home  You may be anxious to go home as soon as possible. Before you and your baby go home, a healthcare provider will check to be sure you are healthy enough to take care of your baby and yourself. You re ready to go home when:     You can walk to the bathroom and use the bathroom without help.    You can eat solid food and swallow pills (if needed).    You have no sign of infection or other health problems, including fever.     You have adequate pain control.    You aren't bleeding isn't excessive.    You are able to care for your  and are emotionally stable.  Before going home, you ll be given written instructions for home self-care after vaginal delivery. Follow these instructions carefully. If you have questions or concerns, talk about them now.   If you have stitches  You may  have received stitches in the skin near your vagina. The stitches might have closed an incision that enlarged the opening of your vagina (episiotomy). Or you may have needed stitches to repair torn skin. Either way, your stitches should dissolve in weeks. Until then, it's important to keep the stitches clean. You can help reduce mild pain, aid healing, and reduce your risk of infection. These tips can help:     Gently wipe from front to back after you urinate or have a bowel movement.    After wiping, spray warm water on the area. Or you can have a sitz bath. This means sitting in a tub with a few inches of water in it. Then pat the area dry or use a hairdryer on a cool setting.    Don't use soap or any solution except water on the area.    You can take a shower unless told not to.    Change sanitary pads at least every 2 to 4 hours.    Place cold or heat packs on the area as directed by your healthcare providers or nurses. Keep a thin towel between the pack and your skin.    Sit on firm seats so the stitches pull less.   follow-up  Schedule a  follow-up exam with your healthcare provider for about 6 weeks after delivery. During this exam, your uterus and vaginal area will be checked. Contact your healthcare provider if you think you or your baby are having any problems.   When to call your healthcare provider  Call your healthcare provider right away if you have:     A fever of 100.4  F ( 38.0 C) or higher, or as directed by your provider    Bleeding that needs a new sanitary pad after an hour, or large blood clots    Pain in your vagina that gets worse and isn't eased with medicine    Swelling, discharge, or more pain from vaginal tear or episiotomy    Burning, pain, red streaks, or lumpy areas in your breasts that may occur with flu-like symptoms    Cracks, blisters, or blood on your nipples    Burning or pain when you urinate    Nausea or vomiting    Dizziness or fainting    Feelings of extreme  sadness or anxiety, or a feeling that you don t want to be with your baby    Belly pain that isn t eased with medicine    Vaginal discharge that has a bad odor    No bowel movement for 5 days    Painful urination, or inability to control urination    Redness, warmth, or pain in the lower leg    Chest pain  [employer] last reviewed this educational content on 8/1/2020 2000-2021 [employer]. All rights reserved. This information is not intended as a substitute for professional medical care. Always follow your healthcare professional's instructions.

## 2021-09-28 NOTE — DISCHARGE SUMMARY
OB Discharge Summary      Date:  2021    Name:  Se Kaur  :  1975  MRN:  [id number]      Admission Date:  2021  Delivery Date: 21  Gestational Age at Delivery:  37w6d  Discharge Date:  2021    Principal Diagnosis:    46 year old female 37w6d with SROM    Other Diagnosis:  Acute Blood Loss Anemia       Conditions complicating Pregnancy:  H/o LTCS  Class B diabetic  History of gHTN  History of PP depression  AMA-age 46  Short interval pregnancy  Anemia (chronic)    Procedure(s) Performed:  Manual  Removal of placenta     Indication for :  NA  Indication for Induction:  NA     Condition at Discharge:  Stable    Discharge Medications:    Se Natasha   Home Medication Instructions ISRAEL:[id number]    Printed on:21 1042   Medication Information                      docusate sodium (COLACE) 100 MG capsule  Take 1 capsule (100 mg) by mouth daily             ferrous gluconate (FERGON) 324 (38 Fe) MG tablet  Take 324 mg by mouth every evening             ibuprofen (ADVIL/MOTRIN) 800 MG tablet  Take 1 tablet (800 mg) by mouth every 6 hours as needed for other (cramping)                        Prenatal Vit-Fe Fumarate-FA (PNV PRENATAL PLUS MULTIVITAMIN) 27-1 MG TABS per tablet  Take 1 tablet by mouth every evening             vitamin D3 (CHOLECALCIFEROL) 50 mcg (2000 units) tablet  Take 1 tablet by mouth every evening                 Discharge Plan:    Follow up with /LATISHA:  6 wks must return fasting for 2hr GTT   Patient Instructions:      Physical activity: as tolerated    Diet:  As tolerated    Medication:  See list    Other:        Physician/CNM:  Brook Aguilar CNM    Name:  Se Kaur  :  1975  MRN:  [id number]

## 2021-09-28 NOTE — LACTATION NOTE
This note was copied from a baby's chart.  A brief follow up was done with Se this  AM in regard to feedings. She was sized for 30mm flanges for her Medela pump.TO call for assistance as needed.

## 2021-09-28 NOTE — PROGRESS NOTES
"Outreach Note for [facility]    Chart reviewed, discharge plan discussed with patient, needs assessed. Patient verbalizes understanding of plan, requests [facility] visit as ordered, [facility] nurse visit planned for , Home Care Intake updated. Patient and , \"Yolande Woodruff\", phone number is reported as correct in EMR.    Patient states she has good support at home, has baby care essentials, and feels ready to discharge today. Outreach RN will continue to follow and assist if needed with discharge plan. No additional needs identified at this time.        "

## 2021-09-28 NOTE — PROGRESS NOTES
Pt declining further blood sugars and insulin as she believes she is A2GDM.  Discussed results early in pregnancy, which indicate pre-diabetes. Pt declines further intervention/testing. However agrees to perform 2hr GTT at her 6wk postpartum

## 2021-09-28 NOTE — PROGRESS NOTES
Discharge order received from MD.  Patient discharge instructions given both written and verbal, patient verbalizes understanding.  0 prescription given.  Pt reports she has all of her belongings.  Patient discharged to home with infant at side.  Patient waiting to be escorted to car.  Patient verbalizes readiness to discharge home today with infant.

## 2021-09-28 NOTE — ANESTHESIA POSTPROCEDURE EVALUATION
Patient: Se Kaur    * No procedures listed *    Diagnosis:* No pre-op diagnosis entered *  Diagnosis Additional Information: No value filed.    Anesthesia Type:  Epidural    Note:     Postop Pain Control: Uneventful            Sign Out: Well controlled pain   PONV: No   Neuro/Psych: Uneventful            Sign Out: Acceptable/Baseline neuro status   Airway/Respiratory: Uneventful            Sign Out: Acceptable/Baseline resp. status   CV/Hemodynamics: Uneventful            Sign Out: Acceptable CV status; No obvious hypovolemia; No obvious fluid overload   Other NRE: NONE   DID A NON-ROUTINE EVENT OCCUR? No    Event details/Postop Comments:   Pain well controlled. Denies headache, nausea/vomiting. Pt ambulating and voiding with no issues. Motor function returned to baseline. No apparent anesthesia related complications. All questions and concerns answered.                     Last vitals:  Vitals Value Taken Time   BP     Temp     Pulse     Resp     SpO2         Electronically Signed By: Brook Cespedes MD  September 27, 2021  7:43 PM

## 2021-09-29 ENCOUNTER — MEDICAL CORRESPONDENCE (OUTPATIENT)
Dept: HEALTH INFORMATION MANAGEMENT | Facility: CLINIC | Age: 46
End: 2021-09-29

## 2022-01-30 ENCOUNTER — HEALTH MAINTENANCE LETTER (OUTPATIENT)
Age: 47
End: 2022-01-30

## 2022-09-25 ENCOUNTER — HEALTH MAINTENANCE LETTER (OUTPATIENT)
Age: 47
End: 2022-09-25

## 2023-05-08 ENCOUNTER — HEALTH MAINTENANCE LETTER (OUTPATIENT)
Age: 48
End: 2023-05-08

## 2024-01-23 ASSESSMENT — ENCOUNTER SYMPTOMS
CONSTIPATION: 0
DIARRHEA: 0
SORE THROAT: 0
MYALGIAS: 0
DIZZINESS: 0
HEMATURIA: 0
HEADACHES: 0
NAUSEA: 0
FEVER: 0
CHILLS: 0
PARESTHESIAS: 0
ARTHRALGIAS: 0
COUGH: 0
SHORTNESS OF BREATH: 0
JOINT SWELLING: 0
BREAST MASS: 0
WEAKNESS: 0
EYE PAIN: 0
FREQUENCY: 0
HEARTBURN: 0
ABDOMINAL PAIN: 0
NERVOUS/ANXIOUS: 0
HEMATOCHEZIA: 0
PALPITATIONS: 0
DYSURIA: 0

## 2024-01-25 ENCOUNTER — ORDERS ONLY (AUTO-RELEASED) (OUTPATIENT)
Dept: FAMILY MEDICINE | Facility: CLINIC | Age: 49
End: 2024-01-25

## 2024-01-25 ENCOUNTER — OFFICE VISIT (OUTPATIENT)
Dept: FAMILY MEDICINE | Facility: CLINIC | Age: 49
End: 2024-01-25
Payer: COMMERCIAL

## 2024-01-25 VITALS
HEART RATE: 64 BPM | HEIGHT: 61 IN | BODY MASS INDEX: 27.93 KG/M2 | SYSTOLIC BLOOD PRESSURE: 136 MMHG | WEIGHT: 147.9 LBS | OXYGEN SATURATION: 98 % | TEMPERATURE: 98.1 F | RESPIRATION RATE: 16 BRPM | DIASTOLIC BLOOD PRESSURE: 70 MMHG

## 2024-01-25 DIAGNOSIS — Z00.00 ENCOUNTER FOR ANNUAL PHYSICAL EXAM: Primary | ICD-10-CM

## 2024-01-25 DIAGNOSIS — R73.03 PRE-DIABETES: ICD-10-CM

## 2024-01-25 DIAGNOSIS — D64.89 ANEMIA DUE TO OTHER CAUSE, NOT CLASSIFIED: ICD-10-CM

## 2024-01-25 DIAGNOSIS — Z12.31 VISIT FOR SCREENING MAMMOGRAM: ICD-10-CM

## 2024-01-25 DIAGNOSIS — Z13.29 SCREENING FOR THYROID DISORDER: ICD-10-CM

## 2024-01-25 DIAGNOSIS — Z13.220 LIPID SCREENING: ICD-10-CM

## 2024-01-25 DIAGNOSIS — R79.89 LOW VITAMIN D LEVEL: ICD-10-CM

## 2024-01-25 DIAGNOSIS — Z23 NEED FOR VACCINATION: ICD-10-CM

## 2024-01-25 DIAGNOSIS — Z11.59 NEED FOR HEPATITIS C SCREENING TEST: ICD-10-CM

## 2024-01-25 DIAGNOSIS — Z12.11 SCREEN FOR COLON CANCER: ICD-10-CM

## 2024-01-25 PROBLEM — E78.9 BORDERLINE HIGH CHOLESTEROL: Status: ACTIVE | Noted: 2020-01-01

## 2024-01-25 PROBLEM — O24.410 DIET CONTROLLED GESTATIONAL DIABETES MELLITUS (GDM), ANTEPARTUM: Status: RESOLVED | Noted: 2020-01-23 | Resolved: 2024-01-25

## 2024-01-25 PROBLEM — O44.40 LOW-LYING PLACENTA: Status: RESOLVED | Noted: 2019-10-31 | Resolved: 2024-01-25

## 2024-01-25 PROBLEM — D64.9 ANEMIA: Status: ACTIVE | Noted: 2020-01-01

## 2024-01-25 PROBLEM — Z34.90 PREGNANT: Status: RESOLVED | Noted: 2021-09-26 | Resolved: 2024-01-25

## 2024-01-25 PROBLEM — O30.043 DICHORIONIC DIAMNIOTIC TWIN PREGNANCY IN THIRD TRIMESTER: Status: RESOLVED | Noted: 2019-08-15 | Resolved: 2024-01-25

## 2024-01-25 PROBLEM — O09.523 MULTIGRAVIDA OF ADVANCED MATERNAL AGE IN THIRD TRIMESTER: Status: RESOLVED | Noted: 2019-08-15 | Resolved: 2024-01-25

## 2024-01-25 PROBLEM — N97.9 INFERTILITY, FEMALE: Status: RESOLVED | Noted: 2017-10-06 | Resolved: 2024-01-25

## 2024-01-25 PROBLEM — O60.00 PRETERM LABOR: Status: RESOLVED | Noted: 2020-02-18 | Resolved: 2024-01-25

## 2024-01-25 LAB
ERYTHROCYTE [DISTWIDTH] IN BLOOD BY AUTOMATED COUNT: 26.5 % (ref 10–15)
HBA1C MFR BLD: 5.5 % (ref 0–5.6)
HCT VFR BLD AUTO: 36.9 % (ref 35–47)
HCV AB SERPL QL IA: NONREACTIVE
HGB BLD-MCNC: 10.9 G/DL (ref 11.7–15.7)
MCH RBC QN AUTO: 20.5 PG (ref 26.5–33)
MCHC RBC AUTO-ENTMCNC: 29.5 G/DL (ref 31.5–36.5)
MCV RBC AUTO: 70 FL (ref 78–100)
PLATELET # BLD AUTO: 331 10E3/UL (ref 150–450)
RBC # BLD AUTO: 5.31 10E6/UL (ref 3.8–5.2)
WBC # BLD AUTO: 4.9 10E3/UL (ref 4–11)

## 2024-01-25 PROCEDURE — 99214 OFFICE O/P EST MOD 30 MIN: CPT | Mod: 25

## 2024-01-25 PROCEDURE — 85027 COMPLETE CBC AUTOMATED: CPT

## 2024-01-25 PROCEDURE — 36415 COLL VENOUS BLD VENIPUNCTURE: CPT

## 2024-01-25 PROCEDURE — 80053 COMPREHEN METABOLIC PANEL: CPT

## 2024-01-25 PROCEDURE — 80061 LIPID PANEL: CPT

## 2024-01-25 PROCEDURE — 82728 ASSAY OF FERRITIN: CPT

## 2024-01-25 PROCEDURE — 83550 IRON BINDING TEST: CPT

## 2024-01-25 PROCEDURE — 84443 ASSAY THYROID STIM HORMONE: CPT

## 2024-01-25 PROCEDURE — 90686 IIV4 VACC NO PRSV 0.5 ML IM: CPT

## 2024-01-25 PROCEDURE — 86803 HEPATITIS C AB TEST: CPT

## 2024-01-25 PROCEDURE — 83540 ASSAY OF IRON: CPT

## 2024-01-25 PROCEDURE — 90471 IMMUNIZATION ADMIN: CPT

## 2024-01-25 PROCEDURE — 83036 HEMOGLOBIN GLYCOSYLATED A1C: CPT

## 2024-01-25 PROCEDURE — 99386 PREV VISIT NEW AGE 40-64: CPT | Mod: 25

## 2024-01-25 PROCEDURE — 82306 VITAMIN D 25 HYDROXY: CPT

## 2024-01-25 ASSESSMENT — ENCOUNTER SYMPTOMS
ABDOMINAL PAIN: 0
WEAKNESS: 0
MYALGIAS: 0
NERVOUS/ANXIOUS: 0
CHILLS: 0
DIARRHEA: 0
DYSURIA: 0
COUGH: 0
DIZZINESS: 0
HEMATURIA: 0
HEADACHES: 0
EYE PAIN: 0
CONSTIPATION: 0
FREQUENCY: 0
SORE THROAT: 0
NAUSEA: 0
ARTHRALGIAS: 0
JOINT SWELLING: 0
PALPITATIONS: 0
FEVER: 0
SHORTNESS OF BREATH: 0

## 2024-01-25 ASSESSMENT — PAIN SCALES - GENERAL: PAINLEVEL: NO PAIN (0)

## 2024-01-25 NOTE — PROGRESS NOTES
Preventive Care Visit  [facility]  Yuliet Moreno PA-C, Family Medicine  Jan 25, 2024       SUBJECTIVE:    is a 48 year old, presenting for the following:  Physical (Pt is fasting. Family hx of cholesterol. Vitamin D labs. Iron check. Prediabetic. )        1/25/2024     7:47 AM   Additional Questions   Roomed by Laura AYALA LPN     Healthy Habits:     Getting at least 3 servings of Calcium per day:  Yes    Bi-annual eye exam:  Yes    Dental care twice a year:  Yes    Sleep apnea or symptoms of sleep apnea:  None    Diet:  Regular (no restrictions)    Frequency of exercise:  4-5 days/week    Duration of exercise:  45-60 minutes    Taking medications regularly:  Yes    Medication side effects:  None    Additional concerns today:  Yes      Today's PHQ-2 Score:       1/25/2024     7:40 AM   PHQ-2 ( 1999 [employer])   Q1: Little interest or pleasure in doing things 0   Q2: Feeling down, depressed or hopeless 0   PHQ-2 Score 0   Q1: Little interest or pleasure in doing things Not at all   Q2: Feeling down, depressed or hopeless Not at all   PHQ-2 Score 0     Patient overall doing well.     History of iron deficiency and gestational diabetes during pregnancy. Currently taking iron supplements since her pregnancy.  She is wondering if she still needs to be taking these that can cause her some constipation.    Have you ever done Advance Care Planning? (For example, a Health Directive, POLST, or a discussion with a medical provider or your loved ones about your wishes): No, advance care planning information given to patient to review.  Patient declined advance care planning discussion at this time.    Social History     Tobacco Use    Smoking status: Never     Passive exposure: Never    Smokeless tobacco: Never   Substance Use Topics    Alcohol use: Not Currently             1/23/2024     1:59 PM   Alcohol Use   Prescreen: >3 drinks/day or >7 drinks/week? No          No data to display               Reviewed orders with patient.  Reviewed health maintenance and updated orders accordingly - Yes  Lab work is in process  BP Readings from Last 3 Encounters:   24 136/70   21 137/75    Wt Readings from Last 3 Encounters:   24 67.1 kg (147 lb 14.4 oz)   21 80.3 kg (177 lb)                    Breast Cancer Screenin/23/2024     2:01 PM   Breast CA Risk Assessment (FHS-7)   Do you have a family history of breast, colon, or ovarian cancer? No / Unknown       click delete button to remove this line now  Mammogram Screening: Recommended annual mammography  Pertinent mammograms are reviewed under the imaging tab.    History of abnormal Pap smear: NO - age 30-65 PAP every 5 years with negative HPV co-testing recommended. Patient is actively menstruating and would like to defer her pap to a later time. No known history of abnormal pap smears.      Reviewed and updated as needed this visit by clinical staff   Tobacco  Allergies  Meds              Reviewed and updated as needed this visit by Provider                  Past Medical History:   Diagnosis Date    Diabetes (H)     gestational diabetes    Dichorionic diamniotic twin pregnancy in third trimester     Diet controlled gestational diabetes mellitus (GDM), antepartum     Fx metatarsal     Incomplete miscarriage     Infertility, female     Low-lying placenta       Review of Systems   Constitutional:  Negative for chills and fever.   HENT:  Negative for congestion, ear pain, hearing loss and sore throat.    Eyes:  Negative for pain and visual disturbance.   Respiratory:  Negative for cough and shortness of breath.    Cardiovascular:  Negative for chest pain and palpitations.   Gastrointestinal:  Negative for abdominal pain, constipation, diarrhea and nausea.   Genitourinary:  Negative for dysuria, frequency, genital sores, hematuria, pelvic pain, urgency, vaginal bleeding and vaginal discharge.   Musculoskeletal:  Negative for  "arthralgias, joint swelling and myalgias.   Skin:  Negative for rash.   Neurological:  Negative for dizziness, weakness and headaches.   Psychiatric/Behavioral:  The patient is not nervous/anxious.        OBJECTIVE:   /70   Pulse 64   Temp 98.1  F (36.7  C) (Oral)   Resp 16   Ht 1.549 m (5' 1\")   Wt 67.1 kg (147 lb 14.4 oz)   LMP 01/24/2024 (Exact Date)   SpO2 98%   Breastfeeding No   BMI 27.95 kg/m     Estimated body mass index is 27.95 kg/m  as calculated from the following:    Height as of this encounter: 1.549 m (5' 1\").    Weight as of this encounter: 67.1 kg (147 lb 14.4 oz).  Physical Exam  GENERAL: alert and no distress  EYES: Eyes grossly normal to inspection, PERRL and conjunctivae and sclerae normal  HENT: ear canals and TM's normal, nose and mouth without ulcers or lesions  NECK: no adenopathy, no asymmetry, masses, or scars  RESP: lungs clear to auscultation - no rales, rhonchi or wheezes  CV: regular rate and rhythm, normal S1 S2, no S3 or S4, no murmur, click or rub, no peripheral edema  ABDOMEN: soft, nontender, no hepatosplenomegaly, no masses  MS: no gross musculoskeletal defects noted  SKIN: no suspicious lesions or rashes  NEURO: Gait is normal, mentation intact and speech normal  PSYCH: mentation appears normal, affect normal/bright    Diagnostic Test Results:  Labs reviewed in Epic  Results for orders placed or performed in visit on 01/25/24 (from the past 24 hour(s))   Hemoglobin A1c   Result Value Ref Range    Hemoglobin A1C 5.5 0.0 - 5.6 %       ASSESSMENT/PLAN:   Encounter for annual physical exam  Patient seen for preventative care visit today.  She is new to the [facility] primary care system.  I updated her medications and medical/family history.  She is overall doing well today and has no acute complaints.  This system states she needs her polio vaccine, patient feels she is up-to-date on all her routine vaccination and declines this at this time.  Patient is due " for Pap smear, she denies any history of abnormal Pap smears at this time.  Patient is currently menstruating and would like to defer her Pap smear to a later date. Education provided for patient to return to clinic within the next few months when she is not menstruating.     Pre-diabetes  Patient previously diagnosed with gestational diabetes.  She was been diagnosed prediabetic after pregnancy.  2 years ago her A1c was 6.5%.  Today it is 5.5%.  We will routinely monitor moving forward.  - Hemoglobin A1c  - CBC with platelets  - Comprehensive metabolic panel (BMP + Alb, Alk Phos, ALT, AST, Total. Bili, TP)      Anemia due to other cause, not classified  Patient reports history of anemia during pregnancy.  This is when she started taking her iron supplements.  She is still taking them at this time and is wondering if she needs to continue.  I will update her iron lab work and be in touch with her regarding the results.  - Ferritin  - Iron and iron binding capacity  - CBC with platelets    Low vitamin D level  Patient reports history of vitamin D deficiency.  She does take a vitamin D supplement.  I will go ahead and check her vitamin D level at this time.  - Vitamin D Deficiency    Screen for colon cancer  Educated patient regarding colonoscopy being gold standard for colon cancer screening.  Patient denies any family history of colon cancer at this time.  She is open to having a colonoscopy when she is in her 50s.  - COLOGUARD(EXACT SCIENCES)    Need for hepatitis C screening test  - Hepatitis C Screen Reflex to HCV RNA Quant and Genotype    Visit for screening mammogram  Routine  - MA SCREENING DIGITAL BILAT - Future  (s+30)    Lipid screening  Routine.  Reviewed past lab work and I am not seeing of prior lipid panel.  I ordered a non-fasting panel but patient was in fact fasting.  - Lipid panel reflex to direct LDL Non-fasting    Screening for thyroid disorder  Routine, patient denies any symptoms of hypo or  hyperthyroidism  - TSH    Need for vaccination  - INFLUENZA VACCINE IM > 6 MONTHS VALENT IIV4 (AFLURIA/FLUZONE)      Patient has been advised of split billing requirements and indicates understanding: Yes      Counseling  Reviewed preventive health counseling, as reflected in patient instructions  Special attention given to:        Colorectal Cancer Screening        She reports that she has never smoked. She has never been exposed to tobacco smoke. She has never used smokeless tobacco.          Signed Electronically by: Yuliet Moreno PA-C  Answers submitted by the patient for this visit:  Annual Preventive Visit (Submitted on 1/23/2024)  Chief Complaint: Annual Exam:  Blood in stool: No  heartburn: No  peripheral edema: No  mood changes: No  Skin sensation changes: No  tenderness: No  breast mass: No  breast discharge: No

## 2024-01-25 NOTE — LETTER
February 1, 2024      Se Kaur  [address] [facility] [address]        Dear ,    We are writing to inform you of your test results.    It was wonderful to meet you the other day. All of your lab work has come back.   Your hepatitis C routine screen returned negative.   Your thyroid function is normal.   In regards to your lipid panel, your 10-year risk of having a cardiac event or stroke is 1%. This is well below the 7% threshold we follow for starting medication treatment.   Your CBC, ferritin, and iron panel are suggestive of iron deficiency anemia. Unfortunately, you should continue taking your iron supplement. If you would like to switch to a liquid iron supplement to see if you tolerate this better, I would be ok with that. Just make sure the daily ELEMENTAL iron amount you are getting is 65 mg or close to it. If you continue with the ferrous gluconate you can try an over the counter stool softener (docusate/colace) to see if this helps.      Last but not least! Your HgA1c was 5.5%! This is normal, you are no longer classified as prediabetic :)  Keep up all your great work.      Please reach out if you have any questions or concerns.     Resulted Orders   Comprehensive metabolic panel (BMP + Alb, Alk Phos, ALT, AST, Total. Bili, TP)   Result Value Ref Range    Sodium 138 135 - 145 mmol/L      Comment:      Reference intervals for this test were updated on 09/26/2023 to more accurately reflect our healthy population. There may be differences in the flagging of prior results with similar values performed with this method. Interpretation of those prior results can be made in the context of the updated reference intervals.     Potassium 4.1 3.4 - 5.3 mmol/L    Carbon Dioxide (CO2) 23 22 - 29 mmol/L    Anion Gap 9 7 - 15 mmol/L    Urea Nitrogen 16.6 6.0 - 20.0 mg/dL    Creatinine 0.59 0.51 - 0.95 mg/dL    GFR Estimate >90 >60 mL/min/1.73m2    Calcium 9.0 8.6 - 10.0 mg/dL    Chloride 106 98 - 107  mmol/L    Glucose 91 70 - 99 mg/dL    Alkaline Phosphatase 92 40 - 150 U/L      Comment:      Reference intervals for this test were updated on 11/14/2023 to more accurately reflect our healthy population. There may be differences in the flagging of prior results with similar values performed with this method. Interpretation of those prior results can be made in the context of the updated reference intervals.    AST 22 0 - 45 U/L      Comment:      Reference intervals for this test were updated on 6/12/2023 to more accurately reflect our healthy population. There may be differences in the flagging of prior results with similar values performed with this method. Interpretation of those prior results can be made in the context of the updated reference intervals.    ALT 19 0 - 50 U/L      Comment:      Reference intervals for this test were updated on 6/12/2023 to more accurately reflect our healthy population. There may be differences in the flagging of prior results with similar values performed with this method. Interpretation of those prior results can be made in the context of the updated reference intervals.      Protein Total 7.4 6.4 - 8.3 g/dL    Albumin 4.3 3.5 - 5.2 g/dL    Bilirubin Total 0.3 <=1.2 mg/dL   Vitamin D Deficiency   Result Value Ref Range    Vitamin D, Total (25-Hydroxy) 20 20 - 50 ng/mL      Comment:      optimum levels    Narrative    Season, race, dietary intake, and treatment affect the concentration of 25-hydroxy-Vitamin D. Values may decrease during winter months and increase during summer months.    Vitamin D determination is routinely performed by an immunoassay specific for 25 hydroxyvitamin D3.  If an individual is on vitamin D2(ergocalciferol) supplementation, please specify 25 OH vitamin D2 and D3 level determination by LCMSMS test VITD23.     Ferritin   Result Value Ref Range    Ferritin 38 6 - 175 ng/mL   Iron and iron binding capacity   Result Value Ref Range    Iron 27 (L) 37 -  145 ug/dL    Iron Binding Capacity 395 240 - 430 ug/dL    Iron Sat Index 7 (L) 15 - 46 %   TSH   Result Value Ref Range    TSH 3.63 0.30 - 4.20 uIU/mL       If you have any questions or concerns, please call the clinic at the number listed above.       Sincerely,      Yuliet Strong PA-C

## 2024-01-25 NOTE — PROGRESS NOTES
Prior to immunization administration, verified patients identity using patient s name and date of birth. Please see Immunization Activity for additional information.     Screening Questionnaire for Adult Immunization    Are you sick today?   No   Do you have allergies to medications, food, a vaccine component or latex?   No   Have you ever had a serious reaction after receiving a vaccination?   No   Do you have a long-term health problem with heart, lung, kidney, or metabolic disease (e.g., diabetes), asthma, a blood disorder, no spleen, complement component deficiency, a cochlear implant, or a spinal fluid leak?  Are you on long-term aspirin therapy?   No   Do you have cancer, leukemia, HIV/AIDS, or any other immune system problem?   No   Do you have a parent, brother, or sister with an immune system problem?   No   In the past 3 months, have you taken medications that affect  your immune system, such as prednisone, other steroids, or anticancer drugs; drugs for the treatment of rheumatoid arthritis, Crohn s disease, or psoriasis; or have you had radiation treatments?   No   Have you had a seizure, or a brain or other nervous system problem?   No   During the past year, have you received a transfusion of blood or blood    products, or been given immune (gamma) globulin or antiviral drug?   No   For women: Are you pregnant or is there a chance you could become       pregnant during the next month?   No   Have you received any vaccinations in the past 4 weeks?   No     Immunization questionnaire answers were all negative.      Patient instructed to remain in clinic for 15 minutes afterwards, and to report any adverse reactions.     Screening performed by Trang Trent CMA on 1/25/2024 at 10:40 AM.

## 2024-01-26 LAB
ALBUMIN SERPL BCG-MCNC: 4.3 G/DL (ref 3.5–5.2)
ALP SERPL-CCNC: 92 U/L (ref 40–150)
ALT SERPL W P-5'-P-CCNC: 19 U/L (ref 0–50)
ANION GAP SERPL CALCULATED.3IONS-SCNC: 9 MMOL/L (ref 7–15)
AST SERPL W P-5'-P-CCNC: 22 U/L (ref 0–45)
BILIRUB SERPL-MCNC: 0.3 MG/DL
BUN SERPL-MCNC: 16.6 MG/DL (ref 6–20)
CALCIUM SERPL-MCNC: 9 MG/DL (ref 8.6–10)
CHLORIDE SERPL-SCNC: 106 MMOL/L (ref 98–107)
CHOLEST SERPL-MCNC: 168 MG/DL
CREAT SERPL-MCNC: 0.59 MG/DL (ref 0.51–0.95)
DEPRECATED HCO3 PLAS-SCNC: 23 MMOL/L (ref 22–29)
EGFRCR SERPLBLD CKD-EPI 2021: >90 ML/MIN/1.73M2
FASTING STATUS PATIENT QL REPORTED: YES
FERRITIN SERPL-MCNC: 38 NG/ML (ref 6–175)
GLUCOSE SERPL-MCNC: 91 MG/DL (ref 70–99)
HDLC SERPL-MCNC: 50 MG/DL
IRON BINDING CAPACITY (ROCHE): 395 UG/DL (ref 240–430)
IRON SATN MFR SERPL: 7 % (ref 15–46)
IRON SERPL-MCNC: 27 UG/DL (ref 37–145)
LDLC SERPL CALC-MCNC: 106 MG/DL
NONHDLC SERPL-MCNC: 118 MG/DL
POTASSIUM SERPL-SCNC: 4.1 MMOL/L (ref 3.4–5.3)
PROT SERPL-MCNC: 7.4 G/DL (ref 6.4–8.3)
SODIUM SERPL-SCNC: 138 MMOL/L (ref 135–145)
TRIGL SERPL-MCNC: 62 MG/DL
TSH SERPL DL<=0.005 MIU/L-ACNC: 3.63 UIU/ML (ref 0.3–4.2)
VIT D+METAB SERPL-MCNC: 20 NG/ML (ref 20–50)

## 2024-02-15 LAB — NONINV COLON CA DNA+OCC BLD SCRN STL QL: NORMAL

## 2024-03-12 LAB — NONINV COLON CA DNA+OCC BLD SCRN STL QL: NEGATIVE

## 2024-07-16 ENCOUNTER — HOSPITAL ENCOUNTER (OUTPATIENT)
Dept: ULTRASOUND IMAGING | Facility: CLINIC | Age: 49
Discharge: HOME | End: 2024-07-16
Attending: FAMILY MEDICINE | Admitting: FAMILY MEDICINE
Payer: COMMERCIAL

## 2024-07-16 ENCOUNTER — PRENATAL OFFICE VISIT (OUTPATIENT)
Dept: FAMILY MEDICINE | Facility: CLINIC | Age: 49
End: 2024-07-16
Payer: COMMERCIAL

## 2024-07-16 VITALS
BODY MASS INDEX: 29.38 KG/M2 | SYSTOLIC BLOOD PRESSURE: 100 MMHG | DIASTOLIC BLOOD PRESSURE: 64 MMHG | WEIGHT: 155.6 LBS | HEIGHT: 61 IN | OXYGEN SATURATION: 99 % | HEART RATE: 89 BPM | TEMPERATURE: 97.4 F

## 2024-07-16 DIAGNOSIS — N92.6 MISSED PERIOD: ICD-10-CM

## 2024-07-16 DIAGNOSIS — Z86.32 HISTORY OF GESTATIONAL DIABETES: ICD-10-CM

## 2024-07-16 DIAGNOSIS — Z76.89 ENCOUNTER TO ESTABLISH CARE: ICD-10-CM

## 2024-07-16 DIAGNOSIS — O09.522 MULTIGRAVIDA OF ADVANCED MATERNAL AGE IN SECOND TRIMESTER: ICD-10-CM

## 2024-07-16 DIAGNOSIS — O09.522 MULTIGRAVIDA OF ADVANCED MATERNAL AGE IN SECOND TRIMESTER: Primary | ICD-10-CM

## 2024-07-16 PROBLEM — Z12.4 CERVICAL CANCER SCREENING: Status: RESOLVED | Noted: 2017-09-29 | Resolved: 2024-07-16

## 2024-07-16 PROBLEM — R93.5 ABNORMAL ULTRASOUND OF ENDOMETRIUM: Status: RESOLVED | Noted: 2019-04-12 | Resolved: 2024-07-16

## 2024-07-16 PROBLEM — N95.1 PERIMENOPAUSE: Status: ACTIVE | Noted: 2024-07-16

## 2024-07-16 PROCEDURE — 99214 OFFICE O/P EST MOD 30 MIN: CPT | Performed by: FAMILY MEDICINE

## 2024-07-16 PROCEDURE — 76816 OB US FOLLOW-UP PER FETUS: CPT

## 2024-07-16 RX ORDER — PRENATAL VIT,CAL 76/IRON/FOLIC 29 MG-1 MG
1 TABLET ORAL DAILY
Qty: 90 TABLET | Refills: 3 | Status: SHIPPED | OUTPATIENT
Start: 2024-07-16

## 2024-07-16 NOTE — PROGRESS NOTES
SUBJECTIVE:     49 year old, female, , 18w1d,  who presents to the clinic today for a new ob visit.    Feels well. Has not  started PNV as wasn't sure if she can get pregnant at age 49. Was going through irregular cycle.  Patient last physical was in January during that time she was on her cycle so for that reason the Pap smear was not done then she skipped a cycle in February and got next cycle in March approximately second week of March  Does complain of lot of nausea not sure of the baby started moving yet but did multiple home pregnancy test which all came back positive in last 2 weeks  And was even prescribed the hormone replacement therapy for hot flashes but she never started them      Estimated Date of Delivery: Dec 16, 2024   Dec 16, 2024 is calculated from Patient's last menstrual period was 2024..      She has not had bleeding since her LMP.   She has had mild nausea. Weight loss has not occurred.   This was not a planned pregnancy. Very shocked and in disbelief with pregnancy  FOB is involved,         OTHER CONCERNS: adv maternal age, history of GDM insulin dependent     ===========================================  ROS      PSYCHIATRIC:   very emotional today  Social Connections: Not on file        History   Drug Use Unknown       History   Smoking Status    Never   Smokeless Tobacco    Never       Social History    Substance and Sexual Activity      Alcohol use: Not Currently      Family History   Problem Relation Age of Onset    Diabetes Type 2  Mother     Cerebrovascular Disease Father          Stroke    Hyperlipidemia Father     Cerebrovascular Disease Brother         2 strokes         MEDICAL HISTORY     No Known Allergies      Current Outpatient Medications:     Cyanocobalamin (B-12 PO), , Disp: , Rfl:     ferrous gluconate (FERGON) 324 (38 Fe) MG tablet, Take 324 mg by mouth every evening, Disp: , Rfl:     vitamin D3 (CHOLECALCIFEROL) 50 mcg (2000 units) tablet, Take 1  "tablet by mouth every evening, Disp: , Rfl:     Past Medical History:   Diagnosis Date    Diabetes (H)     gestational diabetes    Dichorionic diamniotic twin pregnancy in third trimester     Diet controlled gestational diabetes mellitus (GDM), antepartum     Fx metatarsal     Incomplete miscarriage     Infertility, female     Low-lying placenta        Past Surgical History:   Procedure Laterality Date    GYN SURGERY      C/S 20 months ago for twin gestation       OB History    Para Term  AB Living   6 4 4 0 1 4   SAB IAB Ectopic Multiple Live Births   1 0 0 1 4      # Outcome Date GA Lbr Tano/2nd Weight Sex Type Anes PTL Lv   6 Current            5 Term 21 37w6d 09:05 / 00:14 3.419 kg (7 lb 8.6 oz) F Vag-Spont EPI N MELECIO      Name: JOSE,FEMALE-SE      Apgar1: 8  Apgar5: 9   4 Term 20 37w1d  2.792 kg (6 lb 2.5 oz) F CS-LTranv  N MELECIO      Name: [place]      Apgar1: 9  Apgar5: 9   3 SAB  12w0d   U   N FD      Birth Comments: Denies need for interventions   2A Term 14    F Vag-Spont EPI  MELECIO      Name: Adrea   2B Term         MELECIO   1 Term         MELECIO       GYN History-  Abnormal Pap Smears: no                        Cervical procedures: no                        History of STI: no    I personally reviewed the past social/family/medical and surgical history on the date of service.   I reviewed lab work done at Intake visit with patient.    OBJECTIVE:   PHYSICAL EXAM:  /64 (BP Location: Left arm, Patient Position: Sitting, Cuff Size: Adult Regular)   Pulse 89   Temp 97.4  F (36.3  C) (Temporal)   Ht 1.549 m (5' 1\")   Wt 70.6 kg (155 lb 9.6 oz)   LMP 2024   SpO2 99%   BMI 29.40 kg/m    BMI- Body mass index is 29.4 kg/m ., GENERAL:  Pleasant pregnant female, alert, cooperative  and well groomed.    PHYSICAL EXAM  General: Alert, no acute distress.   EYES normocephalic conjunctivae are jayy,   EAR Normal pearly TMs bilaterally without erythema, pus or fluid  Nose is " clear.  Oropharynx is moist and clear,   Neck: supple without adenopathy or thyromegaly.  Lungs: Good aeration bilaterally.Clear to auscultation without wheezes, rales or rhonci.    Heart: regular rate and rhythm, normal S1 and S2, no murmurs  Pelvic exam: decline .   Abdomen: soft and nontender, bowel sounds are present, no hepatosplenomegaly or mass palpable.  Skin: clear without rash or lesions  Neuro: normal muscle tone in all 4 extremities, deep tendon reflexes 2+ symmetrically at the patella     Intrauterine pregnancy 18w1d size apporx consistent with dates    Genetic Screening: Quad Screen    Se was seen today for confirmation of pregnancy.    Diagnoses and all orders for this visit:    Multigravida of advanced maternal age in second trimester  -     US OB <14 Weeks w Transvaginal Single; Future  -     ABO/Rh type and screen; Future  -     Hepatitis B surface antigen; Future  -     CBC with platelets; Future  -     HIV Antigen Antibody Combo; Future  -     Rubella Antibody IgG; Future  -     Treponema Abs w Reflex to RPR and Titer; Future  -     Urine Culture Aerobic Bacterial; Future  -     Hemoglobin A1c; Future  -     TSH with free T4 reflex; Future  -     [facility] Non-Invasive Prenatal Screening-Prequel; Future  -     Alpha fetoprotein maternal screen; Future  -     Mat Fetal [facility] Referral - Pregnancy; Future    Missed period    History of gestational diabetes         PLAN:  Will wait for NIPT testing per patient request as she wants to talk to her  first and as well as be able to confirm the dates  Patient will come back as lab only appointment by end of this week and hopefully get to do her ultrasound today  Advised to start taking prenatal vitamins will wait for aspirin recommendation from [facility] as besides advanced maternal age she currently has no other risk factor  Normal spontaneous vaginal delivery in  after her  for twins would like to do TOLAC again  Orders Placed This  Encounter   Procedures    US OB <14 Weeks w Transvaginal Single    Hepatitis B surface antigen    CBC with platelets    HIV Antigen Antibody Combo    Rubella Antibody IgG    Treponema Abs w Reflex to RPR and Titer    Hemoglobin A1c    TSH with free T4 reflex    [facility] Non-Invasive Prenatal Screening-Prequel    Alpha fetoprotein maternal screen    [facility] Fetal [facility] Referral - Pregnancy    Urine Culture Aerobic Bacterial    ABO/Rh type and screen         Discussed:  - Pre-pregnancy  Body mass index is 29.4 kg/m .   Reviewed best evidence for: weight gain for her weight and height for pregnancy:  RECOMMENDED WEIGHT GAIN: 15-25 lbs.   healthy diet and foods to avoid; exercise and activity during pregnancy;avoiding exposure to toxoplasmosis; and maintenance of a generally healthy lifestyle.   - Influenza vaccine  UTD  - COVID vaccinated  - Genetic screening options, including false positive rate with screening tests and diagnostic options (chorionic villus sampling, amniocentesis),     - Safe medications during pregnancy and prenatal vitamin daily discussed.   - Healthy habits including not using tobacco or alcohol, exercising regularly and maintaining healthy diet  - Information given on tips for dealing with nausea, healthy habits, exposures, safety, prenatal appointment schedule, and when to call the doctor.  - Recommendations for breastfeeding given.    - Preeclampsia risk factors:  High risk factors (1+): yes  Moderate risk factors (2+): yes  Based on her risk factors,  Se Kaur  is  at high risk of preeclampsia. Low-dose aspirin prophylaxis is recommended for prevention of preeclampsia.     - Reviewed use of triage nurse line and contacting the on-call provider after hours for an urgent need such as fever, vagina bleeding, bladder or vaginal infection, rupture of membranes,  or term labor.    -   - Discussed the harms, benefits, side effects and alternative therapies for current prescribed and OTC  medications.  No orders of the defined types were placed in this encounter.    - All pt's  questions discussed and answered.  Pt verbalized understanding of and agreement to plan of care.     - Continue scheduled prenatal care and prn if questions or concerns    Ev Turcios MD 7/16/2024 10:38 AM   [facility].  [phone]

## 2024-07-16 NOTE — PROGRESS NOTES
"  {PROVIDER CHARTING PREFERENCE:565384}    Subjective   Se is a 49 year old, presenting for the following health issues:  Confirmation Of Pregnancy (LMP March, unsure of when. Had many positive at home tests. )        7/16/2024     9:54 AM   Additional Questions   Roomed by Yaritza SPENCER MA     HPI     {MA/LPN/RN Pre-Provider Visit Orders- hCG/UA/Strep (Optional):033970}  {SUPERLIST (Optional):562530}  {additonal problems for provider to add (Optional):245489}    {ROS Picklists (Optional):675898}      Objective    /64 (BP Location: Left arm, Patient Position: Sitting, Cuff Size: Adult Regular)   Pulse 89   Temp 97.4  F (36.3  C) (Temporal)   Ht 1.549 m (5' 1\")   Wt 70.6 kg (155 lb 9.6 oz)   LMP 03/01/2024 (Within Weeks)   SpO2 99%   BMI 29.40 kg/m    Body mass index is 29.4 kg/m .  Physical Exam   {Exam List (Optional):781917}    {Diagnostic Test Results (Optional):713530}        Signed Electronically by: Ev Turcios MD  {Email feedback regarding this note to [email or web address]   :425462}  "

## 2024-07-18 DIAGNOSIS — Z86.32 HISTORY OF GESTATIONAL DIABETES: Primary | ICD-10-CM

## 2024-07-18 DIAGNOSIS — O09.529 AMA (ADVANCED MATERNAL AGE) MULTIGRAVIDA 35+: ICD-10-CM

## 2024-08-05 ENCOUNTER — PRE VISIT (OUTPATIENT)
Dept: MATERNAL FETAL MEDICINE | Facility: CLINIC | Age: 49
End: 2024-08-05
Payer: COMMERCIAL

## 2024-08-13 ENCOUNTER — OFFICE VISIT (OUTPATIENT)
Dept: MATERNAL FETAL MEDICINE | Facility: CLINIC | Age: 49
End: 2024-08-13
Attending: OBSTETRICS & GYNECOLOGY
Payer: COMMERCIAL

## 2024-08-13 ENCOUNTER — HOSPITAL ENCOUNTER (OUTPATIENT)
Dept: ULTRASOUND IMAGING | Facility: CLINIC | Age: 49
Discharge: HOME | End: 2024-08-13
Attending: OBSTETRICS & GYNECOLOGY
Payer: COMMERCIAL

## 2024-08-13 VITALS — SYSTOLIC BLOOD PRESSURE: 125 MMHG | OXYGEN SATURATION: 100 % | HEART RATE: 73 BPM | DIASTOLIC BLOOD PRESSURE: 81 MMHG

## 2024-08-13 DIAGNOSIS — Z86.32 HISTORY OF GESTATIONAL DIABETES: ICD-10-CM

## 2024-08-13 DIAGNOSIS — O09.522 MULTIGRAVIDA OF ADVANCED MATERNAL AGE IN SECOND TRIMESTER: Primary | ICD-10-CM

## 2024-08-13 DIAGNOSIS — Z86.32 H/O GESTATIONAL DIABETES IN PRIOR PREGNANCY, CURRENTLY PREGNANT, SECOND TRIMESTER: ICD-10-CM

## 2024-08-13 DIAGNOSIS — O09.292 H/O GESTATIONAL DIABETES IN PRIOR PREGNANCY, CURRENTLY PREGNANT, SECOND TRIMESTER: ICD-10-CM

## 2024-08-13 DIAGNOSIS — O09.529 AMA (ADVANCED MATERNAL AGE) MULTIGRAVIDA 35+: ICD-10-CM

## 2024-08-13 DIAGNOSIS — O09.292 HX OF PREECLAMPSIA, PRIOR PREGNANCY, CURRENTLY PREGNANT, SECOND TRIMESTER: ICD-10-CM

## 2024-08-13 PROCEDURE — 76811 OB US DETAILED SNGL FETUS: CPT

## 2024-08-13 PROCEDURE — 99214 OFFICE O/P EST MOD 30 MIN: CPT | Mod: 25 | Performed by: OBSTETRICS & GYNECOLOGY

## 2024-08-13 PROCEDURE — G0463 HOSPITAL OUTPT CLINIC VISIT: HCPCS | Mod: 25 | Performed by: OBSTETRICS & GYNECOLOGY

## 2024-08-13 PROCEDURE — 76811 OB US DETAILED SNGL FETUS: CPT | Mod: 26 | Performed by: OBSTETRICS & GYNECOLOGY

## 2024-08-13 PROCEDURE — 96040 HC GENETIC COUNSELING, EACH 30 MINUTES: CPT

## 2024-08-13 NOTE — PROGRESS NOTES
[facility]  Genetic Counseling Consult    Patient:  Se Kaur  Preferred Name:  YOB: 1975   Date of Service:  24   MRN: [id number]     was seen at the [facility] for genetic consultation. The indication for genetic counseling is advanced maternal age. The patient was unaccompanied to this visit.     The session was conducted in English.      IMPRESSION/ PLAN   1. Orders for NIPT had previously been placed for  to have NIPT through [employer], however, this sample was never collected. A staff message was sent to the ordering provider to cancel the NIPT, so that genetic counseling would be able to place these orders and follow-up with results. The patient expressed today she does not consent to any type of genetic screening at this time and does not want this information in the pregnancy.  is aware this options remains available to her and she can reach out to myself or her primary OB provider if she would like to pursue screening. We did discuss the importance of drawing her initial prenatal labs, even if it does not include the genetic screening. I offered to walk  down to lab after her ultrasound to have these labs collected.  explained that she would consider this offer and decide after her ultrasound and MFM consult.     2. 's had blood work prior to her previous pregnancy that was concerning for possible carrier status of alpha thalassemia. To determine what type of carrier  is and how this could affect reproductive risks, molecular carrier screening is recommended for  and her  (depending on her results). We discussed that the four healthy children they have together is reassuring. We also discussed that hemoglobin H disease is included on the [place]  screening program.  did not want to move forward with testing at the time but would like time to consider and contact me.  "    4.  had a level II comprehensive anatomy ultrasound today. Please see the ultrasound report for further details.  also had a MFM consultation today. Please see the consult note for more details.     5. Further recommendation include a follow-up ultrasound with MFM. The upcoming ultrasound has been scheduled for 2024.    PREGNANCY HISTORY   /Parity:       Per medical record review, 's pregnancy history is significant for:   : Pregnancy carried to term with her now 10 year old daughterJackson  : SAB at 12w0d  : Di-di twin pregnancy carried to term with her healthy 4 year old son and daughter  : Pregnancy carried to term with her 2.5 year old daughter    CURRENT PREGNANCY   Current Age: 49 year old   Age at Delivery: 49 year old  MARCOS: 2024, by Last Menstrual Period                                   Gestational Age: 22w1d  This pregnancy is a single gestation.   This pregnancy was conceived spontaneously.   did express how this pregnancy was a surprise and she and her partner thought they were done family planning and that  was beginning to experience pre-menopausal symptoms.  explained that there were very mixed emotions about first learning she was pregnant but now she and her partner are excited. We did discuss that although this pregnancy is welcomed it can still cause a conflict of emotions. We discussed behavioral health resources available to  that can help her to cope or discuss these different emotions. These services were declined at this time, however,  is aware they remain available to her.   MEDICAL HISTORY    s reported medical history is significant for preE and gDM.  had a MFM  consult today following ultrasound. Please see the consult note for further information.        FAMILY HISTORY   A three-generation pedigree was obtained previously by a genetic counselor on 2021 and is scanned under the \"Media\" tab in Epic. There were no " significant updates provided today. Please see the original documentation from Aline Herron MS, [facility] for more information.      Otherwise, the reported family history is unremarkable for multiple miscarriages, stillbirths, birth defects, intellectual disabilities, known genetic conditions, and consanguinity.       RISK ASSESSMENT FOR INHERITED CONDITIONS AND CARRIER SCREENING OPTIONS   Expanded carrier screening is available to screen for autosomal recessive conditions and X-linked conditions in a large list of genes. Carrier screening does not test the pregnancy but gives a risk assessment for the pregnancy and future pregnancies to have the condition. Expanded carrier screening is designed to identify carrier status for conditions that are primarily childhood or adolescent onset. Expanded carrier screening does not evaluate for adult-onset conditions such as hereditary cancer syndromes, dementia/ Alzheimer's disease, or cardiovascular disease risk factors. Additionally, expanded carrier screening is not comprehensive for all known genetic diseases or inherited conditions. Carrier screening does not test for all genetic and health conditions or risk factors.     Autosomal recessive conditions happen when a mutation has been inherited from the egg and sperm and include conditions like cystic fibrosis, thalassemia, hearing loss, spinal muscular atrophy, and more. We reviewed that when both biological parents carry a harmful genetic change in a gene associated with autosomal recessive inheritance, each of their pregnancies has a 1 in 4 (25%) chance to be affected by that condition. X-linked conditions happen when a mutation has been inherited from the egg and include conditions like fragile X syndrome.With x-linked conditions, the specific risk generally depends on the chromosomal sex of the fetus, with XY individuals (generally male) being most severely affected.     [place] screening was reviewed. About [place]  Screening    The patient reports that she and the father of the pregnancy have  ancestry:    The hemoglobinopathies are a group of genetic blood diseases that occur with increased frequency in individuals of  ancestry and carrier screening for these conditions is available.  Carrier screening for the hemoglobinopathies includes a CBC with red blood cell indices, a ferritin level, and a quantitative hemoglobin electrophoresis or HPLC.  In addition,  screening in the [facility] includes many of the hemoglobinopathies.     Expanded carrier screening for mutations in a large panel of genes associated with autosomal recessive conditions including cystic fibrosis, spinal muscular atrophy, and others, is now available.     The patient has declined the carrier screening options reviewed today.    Carrier screening, specifically for alpha-thalassemia, was discussed today. This was previously discussed with genetic counselor, Aline Herron MS, [facility], in . We reviewed that hemoglobin is a major component of red blood cells. Hemoglobinopathies are conditions that affect the level or structure of the hemoglobin and cause conditions such as thalassemia or conditions like sickle cell disease. An individual with a hemoglobinopathy often inherited the condition from their carrier parents.     The following is true for a typical individual:  4 copies of alpha genes that encode for the alpha subunit of hemoglobin (2 copies of HBA1 gene + 2 copies of HBA2 gene)   2 copies of HBB gene that encodes for the beta subunit   2 copies of HBD gene that encodes for the delta subunit   4 copies of gamma genes that encode for the gamma subunit of hemoglobin. This is typically only expressed during fetal development and decreases after birth when the beta subunit is more represented    Se declined an in-depth conversation regarding alpha-thalassemia. She expressed that she is not interested in carrier screening at this  time, however, she is aware that the option remains available to her. The following information has been provided for reference:    Thalassemia conditions are caused by reductions in the alpha or beta subunit. Variant conditions, like sickle cell disease, are cause by mutations in the HBB gene that modify the structure or function of the beta subunit. Hemoglobinopathies have a wide spectrum of symptoms because an individual can have a condition due to the combination of different mutations or even mutations on an alpha and beta gene. Many hemoglobinopathies are screened for on the [place] Screening program.     Carriers for alpha thalassemia have either two or three functional alpha globin gene and do not have any symptoms. Individuals with three alpha globin copies (??/?-), are considered a silent carrier as they do not have any symptoms or abnormal blood count.     Individuals with two alpha globin copies are carriers that may have mild anemia or small red blood cells. They may also have an abnormal blood count but generally do not have significant symptoms. Carriers may have two copies on one chromosome (??/--) or two copies, one of each chromosome (?-/?-). The arrangement of alpha copies is important to determine because in the situation in which the two copies are on one chromosome, that parent could pass on zero copies of alpha genes (--) or (??).        Individuals with only one alpha globin copy (?-/--) have alpha thalassemia and are said to have hemoglobin H disease in which symptoms can range from asymptomatic to anemia with jaundice, fatigue, bone deformities, fatigue, and other minor complications. Individuals with zero alpha globin copies (--/--) have hemoglobin Julio syndrome. This condition is associated with death in utero due to hydrops fetalis. If born an affected baby will be stillborn or die soon after birth.      Individuals who are carriers of alpha thalassemia often have normal  hemoglobin electrophoresis. However, they may have low MCV and/or MCH levels on blood work. Molecular testing is available to detect a deletion of the alpha globin gene. Some molecular testing laboratories only look for the most common deletions or mutations while other choices that sequence the gene would detect many more deletions.       RISK ASSESSMENT FOR CHROMOSOME CONDITIONS   We explained that the risk for fetal chromosome abnormalities increases with maternal age. We discussed specific features of common chromosome abnormalities, including trisomy 21 (Down syndrome), trisomy 13, trisomy 18, and sex chromosome trisomies.    At age 49 at midtrimester, the risk to have a baby with Down syndrome is 1 in 7.   At age 49 at midtrimester, the risk to have a baby with any chromosome abnormality is 1 in 4.   At age 49 at delivery, the risk to have a baby with Down syndrome is 1 in 11.   At age 49 at delivery, the risk to have a baby with any chromosome abnormality is 1 in 8.      has not had genetic screening in this pregnancy and declines options discussed today.     [place] NIPT  We also discussed the option of [facility]ITY NIPT, which can combine the assessment for aneuploidy along with carrier screening, which can reflex to give a fetal risk assessment if the mother is found to be a carrier of one of the screened conditions. [facility] uses the same technology as standard NIPT to assess risks for genetic conditions affecting pregnancy. The testing uses massive parallel sequencing to assess placental DNA fragments and DNA fragments from the pregnant person, and based on the quantification of regions of interest can provide risk assessments for whole chromosome abnormalities and a handful of autosomal recessive conditions. The [facility] Check test first performs routine carrier screening for cystic fibrosis, spinal muscular atrophy and hemoglobinopathies (HBB, HBA1, HBA2) to determine carrier status of the pregnant person through  sequencing. Fragile X is now available for maternal screening as well, but is not able to provide a fetal risk assessment if the pregnant person is found to be a premutation or full mutation carrier. A limitation to UNITY carrier screening for individuals suspected to be a carrier of alpha-thalassemia is that his test will only detect carrier with a deletion of HBA1 and/or HBA2, which makes up approximately 95% of alpha-thalassemia carriers. The remaining 5% of alpha-thalassemia carriers are due to sequence variants, which would not be detected through [facility]ITY carrier screening. If the pregnant person is determined to be a carrier, the testing then reflexes to look for additional variants or additional changes in copy numbers, detected at low levels that would be representative of the cell-free DNA from the pregnancy.      The benefits of UNITY is that the other biological parent of the pregnancy's sample is not needed and the test is non-invasive. The testing skips traditional carrier screening and looks for the presence of the disease in the pregnancy. This is NOT an alternative to diagnostic testing such as an amniocentesis. Since CLARA is a screen, there are false positives and false negatives.     GENETIC TESTING OPTIONS   Genetic testing during a pregnancy includes screening and diagnostic procedures.      Screening tests are non-invasive which means no risk to the pregnancy and includes ultrasounds and blood work. The benefits and limitations of screening were reviewed. Screening tests provide a risk assessment (chance) specific to the pregnancy for certain fetal chromosome abnormalities but cannot definitively diagnose or exclude a fetal chromosome abnormality. Follow-up genetic counseling and consideration of diagnostic testing is recommended with any abnormal screening result. Diagnostic testing during a pregnancy is more certain and can test for more conditions. However, the tests do have a risk of  miscarriage that requires careful consideration. These tests can detect fetal chromosome abnormalities with greater than 99% certainty. Results can be compromised by maternal cell contamination or mosaicism and are limited by the resolution of current genetic testing technology.     There is no screening or diagnostic test that detects all forms of birth defects or intellectual disability.     We discussed the following screening options:     Non-invasive prenatal testing (NIPT)  Also called cell-free DNA screening because it detects chromosomes from the placenta in the pregnant person's blood  Can be done any time after 10 weeks gestation  Standard recommendation for NIPT screens for trisomy 21, trisomy 18, trisomy 13, with the option of adding sex chromosome aneuploidies, without or without predicted sex  Cannot screen for open neural tube defects, maternal serum AFP after 15 weeks is recommended  New NIPT options include screening for other trisomies, microdeletion syndromes, and in some cases fetal blood antigens. Guidelines do not recommend these conditions are included in standard screening. These options have limitations and should be discussed with a genetic counselor.   However, current (2023) ACMG guidelines do recommend that screening for one microdeletion syndrome, called 22q11.2 deletion syndrome be offered to all pregnant patients. 22q11.2 deletion syndrome has an estimated prevalence of 1 in 990 to 1 in 2148 (0.05-0.1%). Risk is not thought to increase with maternal age. Clinical features are variable but include congenital heart defects, cleft palate, developmental delays, immune system deficiencies, and hearing loss. Approximately 90% of cases are de sarika (a sporadic new change in a pregnancy). Cell-free DNA screening for 22q11.2 deletion syndrome is available with the inclusion of other microdeletion syndromes. There is less data about the performance of cell-free DNA screening for more rare  microdeletions and the chance for false positives or negative may be increased.  We discussed the limitations of cell-free DNA screening in detecting microdeletions and the possiblity of false positives and false negatives. The patient declined microdeletion syndrome screening.    We discussed the following ultrasound options:    Comprehensive level II ultrasound (Fetal Anatomy Ultrasound)  Ultrasound done between 18-20 weeks gestation  Screens for major birth defects and markers for aneuploidy (like trisomy 21 and trisomy 18)  Includes looking at the fetus/baby's growth, heart, organs (stomach, kidneys), placenta, and amniotic fluid    We discussed the following diagnostic options:     Amniocentesis  Invasive diagnostic procedure done after 15 weeks gestation  The procedure collects a small sample of amniotic fluid for the purpose of chromosomal testing and/or other genetic testing  Diagnostic result; more than 99% sensitivity for fetal chromosome abnormalities  Testing for AFP in the amniotic fluid can test for open neural tube defects    It was a pleasure to be involved with  s care. Face-to-face time of the meeting was 25 minutes.    Raysa Hayes GC, MS, [facility]  Board Certified and [place] Licensed Genetic Counselor  [facility]  Maternal Fetal Medicine  Office: [phone]  [facility]: [phone]   Fax: [phone]  [facility]

## 2024-08-13 NOTE — PROGRESS NOTES
"Please see \"Imaging\" tab under \"Chart Review\" for details of today's visit, which is summarized below:    Impression  =========    1) Lutz intrauterine pregnancy at 22w 1d gestational age.  2) None of the anomalies commonly detected by ultrasound were evident in the detailed fetal anatomic survey described above.  3) Growth parameters and estimated fetal weight were consistent with an appropriate for gestation age pattern of growth.  4) The amniotic fluid volume appeared normal.    Recommendation  ==============    We discussed the findings on today's ultrasound with the patient.    Regarding 's advanced maternal age, we reviewed that the risk of fetal aneuploidy increases with age. We reviewed these risks and the options available for risk assessment for aneuploidy during pregnancy and reviewed sensitivity and limitations of cfDNA screening and US. We also discussed how those strategies fit in with the available diagnostic tests (like CVS and amniocentesis). At this point,  declines all aneuploidy screening and diagnostic testing, but she will consider cfDNA screening further and will let either us or Dr. Turcios know if she would like to proceed with this.    Patients of advanced age are also at increased risks of pregnancy complications, including preeclampsia, abnormalities with placentation, stillbirth, gestational diabetes, and  delivery.  denies any past medical history outside of pregnancy, but does endorse a history preeclampsia at term with her last pregnancy in  and GDM in her last 2 pregnancies. Given this history, recommend initiation of low dose aspirin (81 mg) daily starting today. This does not need to be stopped prior to delivery.  has not yet had her new OB labs drawn and therefore recommend that she complete these as soon as possible and recommend baseline HELLP labs (CBC, CMP, urine protein/creatinine ratio) with her new OB labs. Also recommend GDM screening at the earlier " end of the normal range (ie at 24 weeks).    Given these discussed risks, recommend repeat assessment of fetal growth and anatomy in 4-6 weeks, followed by serial growth US every 4-6 weeks (more often if GDM recurrent in this pregnancy) in addition to weekly BPP at 34 weeks (or sooner if clinically indicated, eg GDM A2). We have scheduled Se for this follow up US here.    Return to primary provider for continued prenatal care.    Thank-you for the opportunity to participate in the care of this patient. If you have questions regarding today's evaluation or if we can be of further service, please contact the M[facility]etal Medicine Center.    **Fetal anomalies may be present but not detected**    I spent a total of 30 minutes on the date of this encounter including preparing to see the patient (reviewing medical records/tests), in direct face-to-face contact with the patient during her visit with the majority spent counseling and discussing the plan of care and documenting the visit in the electronic medical record. Please see note for details.    Blaire Nickerson MD

## 2024-08-13 NOTE — NURSING NOTE
Patient here for GC/L2/MFM consult due to AMA 49. /81. History reviewed. SBAR given to MD. See US report for recommendations.

## 2024-08-19 LAB
ABO/RH(D): NORMAL
ANTIBODY SCREEN: NEGATIVE
SPECIMEN EXPIRATION DATE: NORMAL

## 2024-08-20 ENCOUNTER — LAB (OUTPATIENT)
Dept: LAB | Facility: CLINIC | Age: 49
End: 2024-08-20
Payer: COMMERCIAL

## 2024-08-20 DIAGNOSIS — O09.522 MULTIGRAVIDA OF ADVANCED MATERNAL AGE IN SECOND TRIMESTER: ICD-10-CM

## 2024-08-20 LAB
ERYTHROCYTE [DISTWIDTH] IN BLOOD BY AUTOMATED COUNT: 14.3 % (ref 10–15)
HBA1C MFR BLD: 6.2 % (ref 0–5.6)
HBV SURFACE AG SERPL QL IA: NONREACTIVE
HCT VFR BLD AUTO: 33 % (ref 35–47)
HGB BLD-MCNC: 10.9 G/DL (ref 11.7–15.7)
HIV 1+2 AB+HIV1 P24 AG SERPL QL IA: NONREACTIVE
MCH RBC QN AUTO: 27.5 PG (ref 26.5–33)
MCHC RBC AUTO-ENTMCNC: 33 G/DL (ref 31.5–36.5)
MCV RBC AUTO: 83 FL (ref 78–100)
PLATELET # BLD AUTO: 285 10E3/UL (ref 150–450)
RBC # BLD AUTO: 3.97 10E6/UL (ref 3.8–5.2)
RUBV IGG SERPL QL IA: 0.79 INDEX
RUBV IGG SERPL QL IA: NORMAL
T PALLIDUM AB SER QL: NONREACTIVE
TSH SERPL DL<=0.005 MIU/L-ACNC: 1.73 UIU/ML (ref 0.3–4.2)
WBC # BLD AUTO: 7.5 10E3/UL (ref 4–11)

## 2024-08-20 PROCEDURE — 86900 BLOOD TYPING SEROLOGIC ABO: CPT

## 2024-08-20 PROCEDURE — 86901 BLOOD TYPING SEROLOGIC RH(D): CPT

## 2024-08-20 PROCEDURE — 87086 URINE CULTURE/COLONY COUNT: CPT

## 2024-08-20 PROCEDURE — 87340 HEPATITIS B SURFACE AG IA: CPT

## 2024-08-20 PROCEDURE — 86762 RUBELLA ANTIBODY: CPT

## 2024-08-20 PROCEDURE — 83036 HEMOGLOBIN GLYCOSYLATED A1C: CPT

## 2024-08-20 PROCEDURE — 87389 HIV-1 AG W/HIV-1&-2 AB AG IA: CPT

## 2024-08-20 PROCEDURE — 85027 COMPLETE CBC AUTOMATED: CPT

## 2024-08-20 PROCEDURE — 84443 ASSAY THYROID STIM HORMONE: CPT

## 2024-08-20 PROCEDURE — 86780 TREPONEMA PALLIDUM: CPT

## 2024-08-20 PROCEDURE — 36415 COLL VENOUS BLD VENIPUNCTURE: CPT

## 2024-08-20 PROCEDURE — 86850 RBC ANTIBODY SCREEN: CPT

## 2024-08-21 LAB — BACTERIA UR CULT: NORMAL

## 2024-08-30 ENCOUNTER — PRENATAL OFFICE VISIT (OUTPATIENT)
Dept: FAMILY MEDICINE | Facility: CLINIC | Age: 49
End: 2024-08-30
Payer: COMMERCIAL

## 2024-08-30 VITALS
TEMPERATURE: 97.4 F | SYSTOLIC BLOOD PRESSURE: 110 MMHG | WEIGHT: 160.9 LBS | BODY MASS INDEX: 30.38 KG/M2 | OXYGEN SATURATION: 99 % | HEART RATE: 96 BPM | DIASTOLIC BLOOD PRESSURE: 78 MMHG | HEIGHT: 61 IN

## 2024-08-30 DIAGNOSIS — O09.522 MULTIGRAVIDA OF ADVANCED MATERNAL AGE IN SECOND TRIMESTER: Primary | ICD-10-CM

## 2024-08-30 DIAGNOSIS — Z86.32 HISTORY OF GESTATIONAL DIABETES: ICD-10-CM

## 2024-08-30 DIAGNOSIS — R73.03 PRE-DIABETES: ICD-10-CM

## 2024-08-30 DIAGNOSIS — O24.410 DIET CONTROLLED GESTATIONAL DIABETES MELLITUS (GDM) IN SECOND TRIMESTER: ICD-10-CM

## 2024-08-30 LAB
GLUCOSE 1H P 50 G GLC PO SERPL-MCNC: 156 MG/DL (ref 70–129)
HGB BLD-MCNC: 11.5 G/DL (ref 11.7–15.7)
T PALLIDUM AB SER QL: NONREACTIVE

## 2024-08-30 PROCEDURE — 86780 TREPONEMA PALLIDUM: CPT | Performed by: FAMILY MEDICINE

## 2024-08-30 PROCEDURE — 99000 SPECIMEN HANDLING OFFICE-LAB: CPT

## 2024-08-30 PROCEDURE — 36415 COLL VENOUS BLD VENIPUNCTURE: CPT | Performed by: FAMILY MEDICINE

## 2024-08-30 PROCEDURE — 82950 GLUCOSE TEST: CPT | Performed by: FAMILY MEDICINE

## 2024-08-30 PROCEDURE — 82105 ALPHA-FETOPROTEIN SERUM: CPT | Mod: 90

## 2024-08-30 PROCEDURE — 85018 HEMOGLOBIN: CPT | Performed by: FAMILY MEDICINE

## 2024-08-30 PROCEDURE — 99207 PR PRENATAL VISIT: CPT | Performed by: FAMILY MEDICINE

## 2024-08-30 PROCEDURE — 90715 TDAP VACCINE 7 YRS/> IM: CPT | Performed by: FAMILY MEDICINE

## 2024-08-30 PROCEDURE — 90471 IMMUNIZATION ADMIN: CPT | Performed by: FAMILY MEDICINE

## 2024-08-30 NOTE — PROGRESS NOTES
Doing well.  No concerns today.  1 hour GTT done today.  Prenatal flowsheet information is reviewed.  Discussed PTL, PROM, and when to call or come in.  Discussed kick counts and fetal movement.  Reportable signs and symptoms discussed.   Taking her baby aspirin and trying to follow diabetic diet    Advanced maternal age  Patient met with both MFM as well as genetic provider: Headache comprehensive ultrasound done with M overall within normal limit but does have a follow-up appointment coming up on 9/17/2024  Patient does not want to do any screening for chromosomal abnormality through cell free DNA testing as well as quad screen as she feel she will keep the baby no matter what.    History of gestational diabetes/prediabetes  Failed 1 hr GTT-156  Significant history of gestational diabetes insulin-dependent last 2 pregnancy A1c was 6.2 diabetic education referral  instead of doing 3-hour test  Diet education provided     Se was seen today for prenatal care.    Diagnoses and all orders for this visit:    Multigravida of advanced maternal age in second trimester  -     Hemoglobin; Future  -     Treponema Abs w Reflex to RPR and Titer; Future  -     Glucose tolerance gest screen 1 hour; Future    Pre-diabetes  -     Glucose tolerance gest screen 1 hour; Future    History of gestational diabetes  -     Glucose tolerance gest screen 1 hour; Future    Other orders  -     TDAP 7+ (ADACEL,BOOSTRIX)

## 2024-09-01 LAB
# FETUSES US: NORMAL
AFP MOM SERPL: 1.14
AFP SERPL-MCNC: 142 NG/ML
AGE - REPORTED: 49.5 YR
CURRENT SMOKER: NO
FAMILY MEMBER DISEASES HX: NO
GA METHOD: NORMAL
GA: NORMAL WK
IDDM PATIENT QL: NO
INTEGRATED SCN PATIENT-IMP: NORMAL
SPECIMEN DRAWN SERPL: NORMAL

## 2024-09-19 ENCOUNTER — TELEPHONE (OUTPATIENT)
Dept: FAMILY MEDICINE | Facility: CLINIC | Age: 49
End: 2024-09-19
Payer: COMMERCIAL

## 2024-09-19 NOTE — TELEPHONE ENCOUNTER
Reason for Call:  Appointment Request    Patient requesting this type of appt:  Pregnancy Pre-    Requested provider:  Dr Turcios    Reason patient unable to be scheduled:  due for OB appointment    When does patient want to be seen/preferred time:  asap    Comments: per Dr. Turcios for prenatal care    Could we send this information to you in [facility] or would you prefer to receive a phone call?:   Patient would prefer a phone call   Okay to leave a detailed message?: Yes at Cell number on file:    Telephone Information:   Mobile [phone]       Call taken on 2024 at 4:56 PM by [place] Oshea MA

## 2024-09-23 NOTE — TELEPHONE ENCOUNTER
09/23/24  Spoke with pt and attempted to schedule. Pt was driving and asked to call back at a later time.   Aruna

## 2024-09-24 ENCOUNTER — OFFICE VISIT (OUTPATIENT)
Dept: MATERNAL FETAL MEDICINE | Facility: CLINIC | Age: 49
End: 2024-09-24
Attending: OBSTETRICS & GYNECOLOGY
Payer: COMMERCIAL

## 2024-09-24 ENCOUNTER — TELEPHONE (OUTPATIENT)
Dept: FAMILY MEDICINE | Facility: CLINIC | Age: 49
End: 2024-09-24

## 2024-09-24 ENCOUNTER — HOSPITAL ENCOUNTER (OUTPATIENT)
Dept: ULTRASOUND IMAGING | Facility: CLINIC | Age: 49
Discharge: HOME | End: 2024-09-24
Attending: OBSTETRICS & GYNECOLOGY
Payer: COMMERCIAL

## 2024-09-24 DIAGNOSIS — O09.522 MULTIGRAVIDA OF ADVANCED MATERNAL AGE IN SECOND TRIMESTER: ICD-10-CM

## 2024-09-24 DIAGNOSIS — O24.419 GESTATIONAL DIABETES MELLITUS (GDM) IN THIRD TRIMESTER, GESTATIONAL DIABETES METHOD OF CONTROL UNSPECIFIED: Primary | ICD-10-CM

## 2024-09-24 PROCEDURE — 99214 OFFICE O/P EST MOD 30 MIN: CPT | Mod: 25 | Performed by: OBSTETRICS & GYNECOLOGY

## 2024-09-24 PROCEDURE — 76816 OB US FOLLOW-UP PER FETUS: CPT

## 2024-09-24 PROCEDURE — 76816 OB US FOLLOW-UP PER FETUS: CPT | Mod: 26 | Performed by: OBSTETRICS & GYNECOLOGY

## 2024-09-24 NOTE — TELEPHONE ENCOUNTER
Please help pt make monae with me diabetic educator   Most likely will need to be on insulin  Referral was placed after her 1 hr GTT  Ev Turcios MD

## 2024-09-24 NOTE — PROGRESS NOTES
Please see full imaging report from ViewPoint program under imaging tab.    Thank-you for referring your patient for ultrasound assessment. I discussed the findings on today's ultrasound with the patient.     She does endorse elevated glucose values but is not on any medication for her gestational diabetes. She is not meeting with any diabetes management team at this time. She has not seen Dr. Turcios in sometime but reports that she plans to see her in a couple of weeks. We did contact the clinic as I do think that she needs a visit soon and some review of her glucose values for potential medication treatment.     She is aware that we will see her back in four weeks and will recommend BPPs weekly at 36 weeks if she is not needing medication and possibly earlier and twice a week if she is needing medication for her GDM.     Return to primary provider for continued prenatal care.    If you have questions regarding today's evaluation or if we can be of further service, please contact the [facility].    **Fetal anomalies may be present but not detected**     I spent a total of  30 minutes on the date of this encounter including preparing to see the patient (reviewing medical records/tests), counseling and discussing the plan of care, documenting the visit in the electronic medical record, and communicating with other health care professionals and/or care coordination.     Jasvir Harris MD  Maternal Fetal Medicine

## 2024-09-24 NOTE — NURSING NOTE
Patient reports good fetal movement,  denies contractions, leaking of fluid, or bleeding.  Reports blood sugar values fasting around 120 and 1 hr post prandial 170-180. Se states she is talking about her blood sugars with a nurse from the [facility], does not work with diabetic education yet and has not started any medications  Patient denies headache, visual changes, epigastric pain related to preeclampsia.  SBAR given to KIMO GARCIA, see their note in Epic.

## 2024-09-24 NOTE — TELEPHONE ENCOUNTER
Provider Communication    Who is calling:  KATHERYN Roman from Maternal Fetal Medicine    Facility in which provider is associated:  [facility] Maternal Fetal Medicine [facility] - Provider Dr. Jasvir Harris MD    Reason for call:  Jessie calling to report patients reported BS as follows: fasting BS - 120, 1 hr after meals 170-180    Dr. Harris recommending that patient be seen by Dr. Turcios this week or next week.     Okay to leave detailed message?:  Yes at Cell number on file:    Telephone Information:   Mobile [phone]

## 2024-10-09 ENCOUNTER — PRENATAL OFFICE VISIT (OUTPATIENT)
Dept: FAMILY MEDICINE | Facility: CLINIC | Age: 49
End: 2024-10-09
Payer: COMMERCIAL

## 2024-10-09 VITALS
SYSTOLIC BLOOD PRESSURE: 100 MMHG | HEIGHT: 61 IN | BODY MASS INDEX: 30.26 KG/M2 | RESPIRATION RATE: 14 BRPM | OXYGEN SATURATION: 100 % | TEMPERATURE: 97.7 F | HEART RATE: 78 BPM | WEIGHT: 160.3 LBS | DIASTOLIC BLOOD PRESSURE: 68 MMHG

## 2024-10-09 DIAGNOSIS — O24.414 INSULIN CONTROLLED GESTATIONAL DIABETES MELLITUS (GDM) IN THIRD TRIMESTER: ICD-10-CM

## 2024-10-09 DIAGNOSIS — O09.523 MULTIGRAVIDA OF ADVANCED MATERNAL AGE IN THIRD TRIMESTER: Primary | ICD-10-CM

## 2024-10-09 DIAGNOSIS — Z12.4 CERVICAL CANCER SCREENING: ICD-10-CM

## 2024-10-09 DIAGNOSIS — Z86.32 HISTORY OF GESTATIONAL DIABETES: ICD-10-CM

## 2024-10-09 DIAGNOSIS — O09.32 LIMITED PRENATAL CARE IN SECOND TRIMESTER: ICD-10-CM

## 2024-10-09 PROCEDURE — 90656 IIV3 VACC NO PRSV 0.5 ML IM: CPT | Performed by: FAMILY MEDICINE

## 2024-10-09 PROCEDURE — 99207 PR PRENATAL VISIT: CPT | Performed by: FAMILY MEDICINE

## 2024-10-09 PROCEDURE — 90471 IMMUNIZATION ADMIN: CPT | Performed by: FAMILY MEDICINE

## 2024-10-09 RX ORDER — ACYCLOVIR 400 MG/1
1 TABLET ORAL ONCE
Qty: 1 EACH | Refills: 0 | Status: SHIPPED | OUTPATIENT
Start: 2024-10-09 | End: 2024-10-09

## 2024-10-09 RX ORDER — ACYCLOVIR 400 MG/1
1 TABLET ORAL
Qty: 9 EACH | Refills: 5 | Status: SHIPPED | OUTPATIENT
Start: 2024-10-09

## 2024-10-09 NOTE — PROGRESS NOTES
Patient who currently at 30w2d with know history of insulin dependent GDM, failed her 1 hour GTTat 157 in AUG and was referred to diabetic educator but never made any monae with them   Or me since then AUG , but I am glad she is here for monae today    Prenatal flowsheet information is reviewed.  Discussed PTL, PROM, and when to call or come in.  Discussed kick counts and fetal movement.  Reportable signs and symptoms discussed.   Taking her baby aspirin and trying to follow diabetic diet    Advanced maternal age  Patient met with both MFM as well as genetic provider: Headache comprehensive ultrasound done with [facility] overall within normal limit had  follow-up appointment on  9/17/2024.  At that visit patient was adv to make monae with me and diabetic educator.  Patient does not want to do any screening for chromosomal abnormality through cell free DNA testing as well as quad screen as she feel she will keep the baby no matter what.    History of gestational diabetes/prediabetes  Failed 1 hr GTT-156  No monae with diabetic educator as she following nurse at the [facility] who is helping monitor her blood sugars and she understand they are out of range    Se was seen today for prenatal care.    Diagnoses and all orders for this visit:    Multigravida of advanced maternal age in third trimester  Comments:  following [facility] nurse , she is checking her blood sugars are 110-130 FBS , PP> 160  Orders:  -     Continuous Glucose  (DEXCOM G7 ) TRACY; 1 each once for 1 dose. Use to read blood sugars as per manufacturers instructions  -     Continuous Glucose Sensor (DEXCOM G7 SENSOR) MISC; 1 each every 10 days. Change sensor every 10 days  -     insulin glargine (LANTUS PEN) 100 UNIT/ML pen; Inject 10 Units subcutaneously at bedtime.    Insulin controlled gestational diabetes mellitus (GDM) in third trimester  -     Continuous Glucose  (DEXCOM G7 ) TRACY; 1 each once for 1 dose. Use to  read blood sugars as per manufacturers instructions  -     Continuous Glucose Sensor (DEXCOM G7 SENSOR) MISC; 1 each every 10 days. Change sensor every 10 days  -     insulin glargine (LANTUS PEN) 100 UNIT/ML pen; Inject 10 Units subcutaneously at bedtime.  -     Med Therapy Management Referral    History of gestational diabetes    Limited prenatal care in second trimester    Cervical cancer screening    Other orders  -     INFLUENZA VACCINE,SPLIT VIRUS,TRIVALENT,PF(FLUZONE); Future  -     INFLUENZA VACCINE,SPLIT VIRUS,TRIVALENT,PF(FLUZONE)         Plan : GDM: As patient reports she is following a  [facility] nurse who is helping manage her diabetes she started checking her blood sugars and most of them are in out of range   Fasting blood sugars are 110-130 and postprandial 140-160 she willing to start 10 units Lantus today she had done that in the past and does not require any further education and does not want to see diabetic educator    Hoping she will get the CGM monitor that will help manage the diabetes to more optimal level    History of diabetic educator we did the referral to MTM who was available in the clinic but as she was busy with other patients she was not able to see as patient has to go back to work  We will try for her to connect our MTM virtually then she can share her blood sugar log to help adjust Lantus accordingly  And understand uncontrolled diabetes increase the risk for large for gestational age baby, stillbirth and other birth complications    She does have appointment with M 10/29  Will start biophysical profile starting at 34 weeks(she absolutely does not want to do twice weekly BPP advised patient at least make once weekly appointment) and weekly appointment with me to do NSTs    Ev Turcios MD

## 2024-10-09 NOTE — PATIENT INSTRUCTIONS
I am hoping start using continuous glucose monitor for the following reasons: insulin dependent diabetes during pregnancy   Start taking lantus 10 units at night gradual increase if fasting blood sugar >95 or post prandial sugars >140  Follow up 2 wk for routine prenatal care   We have you see our pharmacist as unable to make monae with side effects ( I am glad you are following [facility] nurse )    Ev Turcios MD

## 2024-10-10 ENCOUNTER — TELEPHONE (OUTPATIENT)
Dept: FAMILY MEDICINE | Facility: CLINIC | Age: 49
End: 2024-10-10
Payer: COMMERCIAL

## 2024-10-10 ENCOUNTER — TELEPHONE (OUTPATIENT)
Dept: FAMILY MEDICINE | Facility: CLINIC | Age: 49
End: 2024-10-10

## 2024-10-10 NOTE — TELEPHONE ENCOUNTER
Prior Authorization Retail Medication Request    Medication/Dose: Continuous Glucose Sensor (DEXCOM G7 SENSOR) MISC   Diagnosis and ICD code (if different than what is on RX):  See Chart  New/renewal/insurance change PA/secondary ins. PA:  Previously Tried and Failed:  See Chart  Rationale:  See Chart    Insurance   Primary: [facility]  Insurance ID:  [id number]    Secondary (if applicable):See Chart  Insurance ID:  See Chart    Pharmacy Information (if different than what is on RX)  Name:  [facility]  Phone:  [phone]  Fax:[phone]  [facility] CODE: [id number]    Clinic Information  Preferred routing pool for dept communication: [facility]

## 2024-10-10 NOTE — TELEPHONE ENCOUNTER
Prior Authorization Retail Medication Request    Medication/Dose: Continuous Glucose  (DEXCOM G7 ) TRACY  Diagnosis and ICD code (if different than what is on RX):  See Chart  New/renewal/insurance change PA/secondary ins. PA:  Previously Tried and Failed:  See Chart  Rationale:  See Chart    Insurance   Primary: [facility]   Insurance ID:   [id number]   Secondary (if applicable):See Chart  Insurance ID:  See Chart    Pharmacy Information (if different than what is on RX)  Name:  [facility]   Phone:   [phone]   Fax:[phone]   [facility] CODE: [id number]    Clinic Information  Preferred routing pool for dept communication: [facility]

## 2024-10-11 ENCOUNTER — TELEPHONE (OUTPATIENT)
Dept: FAMILY MEDICINE | Facility: CLINIC | Age: 49
End: 2024-10-11
Payer: COMMERCIAL

## 2024-10-11 NOTE — TELEPHONE ENCOUNTER
MTM referral from: [facility] visit (referral by provider)    MTM referral outreach attempt #2 on October 11, 2024 at 11:29 AM      Outcome: Patient not reachable after several attempts, sent Farmanhart message    Use davon perez and clemente for the carrier/Plan on the flowsheet      MyChart Message Sent    See Hector  [facility]   [phone]

## 2024-10-14 NOTE — TELEPHONE ENCOUNTER
LVM to schedule MTM visit at [phone].    Amy Campos, PharmD  Medication Therapy Management (MTM) Pharmacist

## 2024-10-15 NOTE — TELEPHONE ENCOUNTER
Retail Pharmacy Prior Authorization Team   Phone: [phone]    PA Initiation    Medication: DEXCOM G7  TRACY  Insurance Company: [employer] - Phone [phone] Fax [phone]  Pharmacy Filling the Rx: [facility] [address] [facility] [address] [place]  Filling Pharmacy Phone: [phone]  Filling Pharmacy Fax:    Start Date: 10/15/2024

## 2024-10-15 NOTE — TELEPHONE ENCOUNTER
Retail Pharmacy Prior Authorization Team   Phone: [phone]    PA Initiation    Medication: DEXCOM G7 SENSOR MISC  Insurance Company: [employer] - Phone [phone] Fax [phone]  Pharmacy Filling the Rx: [facility] [address] [facility] [address] [place]  Filling Pharmacy Phone: [phone]  Filling Pharmacy Fax:    Start Date: 10/15/2024

## 2024-10-16 ENCOUNTER — TELEPHONE (OUTPATIENT)
Dept: FAMILY MEDICINE | Facility: CLINIC | Age: 49
End: 2024-10-16
Payer: COMMERCIAL

## 2024-10-16 NOTE — TELEPHONE ENCOUNTER
Patient Quality Outreach    Patient is due for the following:   Breast Cancer Screening - Mammogram  Cervical Cancer Screening - PAP Needed      Topic Date Due    COVID-19 Vaccine (3 - 2024-25 season) 09/01/2024       Next Steps:   No follow up needed at this time. Pt currently pregnant.     Type of outreach:    Chart review performed, no outreach needed.      Questions for provider review:    None           Laura Bess

## 2024-10-17 NOTE — TELEPHONE ENCOUNTER
Retail Pharmacy Prior Authorization Team   Phone: [phone]    Prior Authorization Approval    Medication: DEXCOM G7 SENSOR MISC  Authorization Effective Date: 10/17/2024  Authorization Expiration Date: 10/17/2027  Insurance Company: [employer] - Phone [phone] Fax [phone]  Which Pharmacy is filling the prescription: [facility] [address] [place] [address]  Pharmacy Notified: yes  Patient Notified: yes **Instructed pharmacy to notify patient when script is ready to /ship.**

## 2024-10-22 NOTE — TELEPHONE ENCOUNTER
Prior Authorization Not Needed per Insurance    Medication: DEXCOM G7  TRACY  Insurance Company: [employer] - Phone [phone] Fax [phone]  Expected CoPay: $    Pharmacy Filling the Rx: [facility] [address] [facility] [place]    PA not needed as Dexcom G7 Sensors was approved. This PA was closed by insurance due to this reason.

## 2024-10-24 ENCOUNTER — OFFICE VISIT (OUTPATIENT)
Dept: FAMILY MEDICINE | Facility: CLINIC | Age: 49
End: 2024-10-24
Payer: COMMERCIAL

## 2024-10-24 VITALS
WEIGHT: 163 LBS | BODY MASS INDEX: 30.78 KG/M2 | HEART RATE: 82 BPM | TEMPERATURE: 97.9 F | OXYGEN SATURATION: 99 % | HEIGHT: 61 IN | RESPIRATION RATE: 14 BRPM

## 2024-10-24 DIAGNOSIS — O09.523 MULTIGRAVIDA OF ADVANCED MATERNAL AGE IN THIRD TRIMESTER: Primary | ICD-10-CM

## 2024-10-24 DIAGNOSIS — N76.0 BV (BACTERIAL VAGINOSIS): ICD-10-CM

## 2024-10-24 DIAGNOSIS — B96.89 BV (BACTERIAL VAGINOSIS): ICD-10-CM

## 2024-10-24 DIAGNOSIS — K59.01 SLOW TRANSIT CONSTIPATION: ICD-10-CM

## 2024-10-24 DIAGNOSIS — B37.31 YEAST INFECTION OF THE VAGINA: ICD-10-CM

## 2024-10-24 DIAGNOSIS — R35.0 URINE FREQUENCY: ICD-10-CM

## 2024-10-24 DIAGNOSIS — N93.9 VAGINAL SPOTTING: ICD-10-CM

## 2024-10-24 DIAGNOSIS — O24.414 INSULIN CONTROLLED GESTATIONAL DIABETES MELLITUS (GDM) IN SECOND TRIMESTER: ICD-10-CM

## 2024-10-24 DIAGNOSIS — O09.32 LIMITED PRENATAL CARE IN SECOND TRIMESTER: ICD-10-CM

## 2024-10-24 LAB
ALBUMIN UR-MCNC: NEGATIVE MG/DL
APPEARANCE UR: CLEAR
BACTERIA #/AREA URNS HPF: ABNORMAL /HPF
BILIRUB UR QL STRIP: NEGATIVE
CLUE CELLS: PRESENT
COLOR UR AUTO: YELLOW
GLUCOSE UR STRIP-MCNC: NEGATIVE MG/DL
HGB UR QL STRIP: NEGATIVE
KETONES UR STRIP-MCNC: 40 MG/DL
LEUKOCYTE ESTERASE UR QL STRIP: ABNORMAL
NITRATE UR QL: NEGATIVE
PH UR STRIP: 6 [PH] (ref 5–8)
RBC #/AREA URNS AUTO: ABNORMAL /HPF
SP GR UR STRIP: 1.02 (ref 1–1.03)
SQUAMOUS #/AREA URNS AUTO: ABNORMAL /LPF
TRICHOMONAS, WET PREP: ABNORMAL
UROBILINOGEN UR STRIP-ACNC: 0.2 E.U./DL
WBC #/AREA URNS AUTO: ABNORMAL /HPF
WBC'S/HIGH POWER FIELD, WET PREP: ABNORMAL
YEAST, WET PREP: PRESENT

## 2024-10-24 PROCEDURE — 87210 SMEAR WET MOUNT SALINE/INK: CPT | Performed by: FAMILY MEDICINE

## 2024-10-24 PROCEDURE — 99207 PR PRENATAL VISIT: CPT | Performed by: FAMILY MEDICINE

## 2024-10-24 PROCEDURE — 81001 URINALYSIS AUTO W/SCOPE: CPT | Performed by: FAMILY MEDICINE

## 2024-10-24 PROCEDURE — 90678 RSV VACC PREF BIVALENT IM: CPT | Performed by: FAMILY MEDICINE

## 2024-10-24 PROCEDURE — 90471 IMMUNIZATION ADMIN: CPT | Performed by: FAMILY MEDICINE

## 2024-10-24 RX ORDER — METRONIDAZOLE 500 MG/1
500 TABLET ORAL 2 TIMES DAILY
Qty: 14 TABLET | Refills: 0 | Status: SHIPPED | OUTPATIENT
Start: 2024-10-24 | End: 2024-10-31

## 2024-10-24 RX ORDER — CLOTRIMAZOLE 1 %
1 CREAM WITH APPLICATOR VAGINAL AT BEDTIME
Qty: 45 G | Refills: 0 | Status: SHIPPED | OUTPATIENT
Start: 2024-10-24

## 2024-10-24 NOTE — PROGRESS NOTES
Patient who currently at 30w2d with insulin dependent GDM.  New concern today for last 2 mornings she getting some pink discharge when she wipes after urination  Increased urinary frequency and some vaginal itching.  We did do wet prep today  Prenatal flowsheet information is reviewed.  Discussed PTL, PROM, and when to call or come in.  5 - 1 - 1 rule out labor discussed with the patient and when to reach out  Discussed kick counts and fetal movement.  Reportable signs and symptoms discussed.   Taking her baby aspirin and trying to follow diabetic diet  OB History    Para Term  AB Living   5 3 3 0 1 4   SAB IAB Ectopic Multiple Live Births   1 0 0 1 4      # Outcome Date GA Lbr Tano/2nd Weight Sex Type Anes PTL Lv   5 Current            4 Term 21 37w6d 09:05 / 00:14 3.419 kg (7 lb 8.6 oz) F Vag-Spont EPI N MELECIO      Name: JOSE,FEMALE-SE      Apgar1: 8  Apgar5: 9   3A Term 20 37w1d  2.792 kg (6 lb 2.5 oz) F CS-LTranv  N MELECIO      Name: [place]      Apgar1: 9  Apgar5: 9   3B Term 20 37w1d   M CS-LTranv   MELECIO      Birth Comments: di/di twins      Complications: Meconium staining   2 2017 12w0d   U   N FD      Birth Comments: Denies need for interventions   1 Term 14   2.81 kg (6 lb 3.1 oz) F Vag-Spont EPI  MELECIO      Name: Fer      Advanced maternal age  Patient met with both MFM as well as genetic provider: Had comprehensive ultrasound done with MFM overall within normal limit       GDM insulin dependent :  Failed 1 hr GTT-156 started on lantus 10 units last visit   No monae with diabetic educator as she following nurse at the [facility] who is helping monitor her blood sugars , did Rx for CGM last visit       Se was seen today for prenatal care.    Diagnoses and all orders for this visit:    Multigravida of advanced maternal age in third trimester    Insulin controlled gestational diabetes mellitus (GDM) in second trimester  Comments:  unable to get CGM, but checking her  blood sugars FBS<90 and PP <140 taking lantus 10 units every night    Limited prenatal care in second trimester    Slow transit constipation    Vaginal spotting  Comments:  yeast infection  Orders:  -     Wet prep - Clinic Collect    Urine frequency  -     UA Macroscopic with reflex to Microscopic and Culture - Clinic Collect  -     UA Microscopic with Reflex to Culture    Yeast infection of the vagina  -     clotrimazole (LOTRIMIN) 1 % vaginal cream; Place 1 Applicatorful vaginally at bedtime.    BV (bacterial vaginosis)  -     metroNIDAZOLE (FLAGYL) 500 MG tablet; Take 1 tablet (500 mg) by mouth 2 times daily for 7 days.    Other orders  -     RSV VACCINE (ABRYSVO)  -     PRIMARY CARE FOLLOW-UP SCHEDULING; Future         Plan : GDM started her lantus and blood sugar in good range per pt didn't bring her meter to double check  Follow up in 2 wk then weekly after that .  UA showing trace ketones but no glucose   She does have appointment with M 10/29  Will start biophysical profile starting at 34 weeks and weekly appointment with me to do NSTs each visit     Ev Turcios MD

## 2024-10-29 ENCOUNTER — HOSPITAL ENCOUNTER (OUTPATIENT)
Dept: ULTRASOUND IMAGING | Facility: CLINIC | Age: 49
Discharge: HOME | End: 2024-10-29
Attending: STUDENT IN AN ORGANIZED HEALTH CARE EDUCATION/TRAINING PROGRAM
Payer: COMMERCIAL

## 2024-10-29 ENCOUNTER — OFFICE VISIT (OUTPATIENT)
Dept: MATERNAL FETAL MEDICINE | Facility: CLINIC | Age: 49
End: 2024-10-29
Attending: STUDENT IN AN ORGANIZED HEALTH CARE EDUCATION/TRAINING PROGRAM
Payer: COMMERCIAL

## 2024-10-29 DIAGNOSIS — O24.414 INSULIN CONTROLLED GESTATIONAL DIABETES MELLITUS (GDM) IN THIRD TRIMESTER: ICD-10-CM

## 2024-10-29 DIAGNOSIS — O24.419 GESTATIONAL DIABETES MELLITUS (GDM) IN THIRD TRIMESTER, GESTATIONAL DIABETES METHOD OF CONTROL UNSPECIFIED: Primary | ICD-10-CM

## 2024-10-29 DIAGNOSIS — O24.419 GESTATIONAL DIABETES MELLITUS (GDM) IN THIRD TRIMESTER, GESTATIONAL DIABETES METHOD OF CONTROL UNSPECIFIED: ICD-10-CM

## 2024-10-29 PROCEDURE — 76818 FETAL BIOPHYS PROFILE W/NST: CPT

## 2024-10-29 PROCEDURE — 76816 OB US FOLLOW-UP PER FETUS: CPT | Mod: 26 | Performed by: STUDENT IN AN ORGANIZED HEALTH CARE EDUCATION/TRAINING PROGRAM

## 2024-10-29 PROCEDURE — 99213 OFFICE O/P EST LOW 20 MIN: CPT | Mod: 25 | Performed by: STUDENT IN AN ORGANIZED HEALTH CARE EDUCATION/TRAINING PROGRAM

## 2024-10-29 PROCEDURE — 76818 FETAL BIOPHYS PROFILE W/NST: CPT | Mod: 26 | Performed by: STUDENT IN AN ORGANIZED HEALTH CARE EDUCATION/TRAINING PROGRAM

## 2024-10-29 NOTE — PATIENT INSTRUCTIONS
Patient Education   Biophysical Profile: About This Test  What is it?     A biophysical profile (BPP) measures the health of your baby during your pregnancy. The BPP checks your baby's heart rate, muscle tone, movement, and breathing. It also measures the amount of amniotic fluid around your baby. Looking at these five areas helps your doctor know how well your baby is doing.  Why is this test done?  A BPP is often done in the last trimester of pregnancy when there is any question about how the baby is doing. For high-risk pregnancies, this test may be done every week or twice a week during later pregnancy.  How do you prepare for the test?  If you smoke, you will be asked to stop smoking for 2 hours before testing. This is because smoking affects the baby's heart rate and movements.  You may be asked to drink water or other liquids just before testing. You will be able to empty your bladder after the test.  How is the test done?  The BPP includes a fetal ultrasound and usually includes a nonstress test. For the tests, you will lie back on a padded exam table. If you become short of breath or lightheaded while lying on your back, say so. The technician can help you change your position.  Nonstress test  Two elastic belts with sensors are placed across your belly. One sensor tracks your baby's heart rate with reflected sound waves (Doppler ultrasound). The other sensor measures how long your contractions are, if you are having any.  You may hear your baby's heartbeat as a beeping sound. You may see it printed out on a chart.  You may be asked to push a button on the machine when your baby moves or you have a contraction. This helps your doctor look at how your baby's heart reacts to movement and contractions.  If there isn't much movement, it may be because the baby is asleep. If this happens during your test, the technician may try to wake the baby with a loud noise or by having you eat or drink something.  Fetal  "ultrasound  A gel will be spread on your belly. This helps the passage of sound waves.  A small, handheld sensor will be pressed against the gel on your skin and moved across your belly a few times.  You may be able to watch the screen to see the picture of your baby during the test.  How long does the test take?  The nonstress test will take about 20 to 40 minutes.  The fetal ultrasound will take about 30 to 60 minutes.  What happens after the test?  The doctor will read the results from the test and talk to you about them. You will also find out if more testing is needed.  You will probably be able to go home right away. It depends on the reason for the test.  You can go back to your usual activities right away.  Follow-up care is a key part of your treatment and safety. Be sure to make and go to all appointments, and call your doctor if you are having problems. It's also a good idea to keep a list of the medicines you take. Ask your doctor when you can expect to have your test results.  Where can you learn more?  Go to https://www.[employer].net/patiented  Enter X659 in the search box to learn more about \"Biophysical Profile: About This Test.\"  Current as of: July 10, 2023  Content Version: 14.2 2024 [facility] [employer].   Care instructions adapted under license by your healthcare professional. If you have questions about a medical condition or this instruction, always ask your healthcare professional. [employer] disclaims any warranty or liability for your use of this information.       "

## 2024-10-29 NOTE — NURSING NOTE
Patient reports active fetal movement, denies pain, contractions, leaking of fluid, or bleeding.  Reports blood sugar values in range on 10units HS.  Patient denies headache, visual changes, nausea/vomiting, epigastric pain related to preeclampsia.  Education provided to patient on Biophysical Profile US.  SBAR given to MFCESARIO MD, see their note in Epic.    NST Performed due to BPP 6/8; off for breathing.   reviewed efm tracing. See NST/BPP Doc Flowsheet tab.

## 2024-10-29 NOTE — PROGRESS NOTES
The patient was seen for an ultrasound in the [facility] clinic today.  For a detailed report of the ultrasound examination, please see the ultrasound report which can be found under the imaging tab.    If you have questions regarding today's evaluation or if we can be of further service, please contact the [facility].    David Sevilla M.D.  Maternal Fetal-Medicine Specialist

## 2024-11-12 ENCOUNTER — PRENATAL OFFICE VISIT (OUTPATIENT)
Dept: FAMILY MEDICINE | Facility: CLINIC | Age: 49
End: 2024-11-12
Payer: COMMERCIAL

## 2024-11-12 VITALS
OXYGEN SATURATION: 97 % | HEIGHT: 61 IN | TEMPERATURE: 97.3 F | BODY MASS INDEX: 31.04 KG/M2 | WEIGHT: 164.4 LBS | RESPIRATION RATE: 14 BRPM | HEART RATE: 79 BPM

## 2024-11-12 DIAGNOSIS — O09.523 MULTIGRAVIDA OF ADVANCED MATERNAL AGE IN THIRD TRIMESTER: Primary | ICD-10-CM

## 2024-11-12 DIAGNOSIS — O09.32 LIMITED PRENATAL CARE IN SECOND TRIMESTER: ICD-10-CM

## 2024-11-12 DIAGNOSIS — B37.31 YEAST INFECTION OF THE VAGINA: ICD-10-CM

## 2024-11-12 DIAGNOSIS — O24.414 INSULIN CONTROLLED GESTATIONAL DIABETES MELLITUS (GDM) IN SECOND TRIMESTER: ICD-10-CM

## 2024-11-12 LAB
ALBUMIN UR-MCNC: NEGATIVE MG/DL
APPEARANCE UR: CLEAR
BACTERIA #/AREA URNS HPF: ABNORMAL /HPF
BILIRUB UR QL STRIP: NEGATIVE
COLOR UR AUTO: YELLOW
GLUCOSE UR STRIP-MCNC: NEGATIVE MG/DL
HGB UR QL STRIP: NEGATIVE
KETONES UR STRIP-MCNC: NEGATIVE MG/DL
LEUKOCYTE ESTERASE UR QL STRIP: ABNORMAL
NITRATE UR QL: NEGATIVE
PH UR STRIP: 6.5 [PH] (ref 5–8)
RBC #/AREA URNS AUTO: ABNORMAL /HPF
SP GR UR STRIP: 1.02 (ref 1–1.03)
SQUAMOUS #/AREA URNS AUTO: ABNORMAL /LPF
UROBILINOGEN UR STRIP-ACNC: 0.2 E.U./DL
WBC #/AREA URNS AUTO: ABNORMAL /HPF
YEAST #/AREA URNS HPF: ABNORMAL /HPF
YEAST #/AREA URNS HPF: ABNORMAL /HPF

## 2024-11-12 PROCEDURE — 81001 URINALYSIS AUTO W/SCOPE: CPT | Performed by: FAMILY MEDICINE

## 2024-11-12 PROCEDURE — 99207 PR PRENATAL VISIT: CPT | Performed by: FAMILY MEDICINE

## 2024-11-12 PROCEDURE — 59025 FETAL NON-STRESS TEST: CPT | Performed by: FAMILY MEDICINE

## 2024-11-12 RX ORDER — CLOTRIMAZOLE 1 %
1 CREAM WITH APPLICATOR VAGINAL AT BEDTIME
Qty: 45 G | Refills: 1 | Status: SHIPPED | OUTPATIENT
Start: 2024-11-12

## 2024-11-12 NOTE — PROGRESS NOTES
Patient who currently at 35w1d . insulin dependent GDM.  Prenatal flowsheet information is reviewed.  Discussed PTL, PROM, and when to call or come in.  5 - 1 - 1 rule out labor discussed with the patient and when to reach out  Discussed kick counts and fetal movement.  Reportable signs and symptoms discussed.   Taking her baby aspirin and trying to follow diabetic diet  OB History    Para Term  AB Living   5 3 3 0 1 4   SAB IAB Ectopic Multiple Live Births   1 0 0 1 4      # Outcome Date GA Lbr Tano/2nd Weight Sex Type Anes PTL Lv   5 Current            4 Term 21 37w6d 09:05 / 00:14 3.419 kg (7 lb 8.6 oz) F Vag-Spont EPI N MELECIO      Name: JOSEFEMALE-SE      Apgar1: 8  Apgar5: 9   3A Term 20 37w1d  2.792 kg (6 lb 2.5 oz) F CS-LTranv  N MELECIO      Name: Termo      Apgar1: 9  Apgar5: 9   3B Term 20 37w1d   M CS-LTranv   MELECIO      Birth Comments: di/di twins      Complications: Meconium staining   2 2017 12w0d   U   N FD      Birth Comments: Denies need for interventions   1 Term 14   2.81 kg (6 lb 3.1 oz) F Vag-Spont EPI  MELECIO      Name: Adrea      Advanced maternal age  Patient met with both MFM as well as genetic provider: Had comprehensive ultrasound done with MFM overall within normal limit     GDM insulin dependent :  Failed 1 hr GTT-156 started on lantus 10 units last visit   No monae with diabetic educator as she following nurse at the [facility] who is helping monitor her blood sugars , did Rx for CGM last visit unable to get it  Fasting blood sugar less than 95 postprandial around 120s    MFM visit :  Discussed recommendations for surveillance in the setting of GDMA2 on insulin. Given increased risk for stillbirth with GDMA2, recommend twice weekly   surveillance beginning at 32 weeks. Se notes that coming twice a week is not feasible for her at this time. She works full time and also has four children at home for which  she needs to get childcare for  these appointments. Given that she is unable to come twice weekly, which is my medical recommendation, we discussed the option of  weekly surveillance as an alternative. She states that this is still difficult given that she has to attend prenatal visits with her clinic as well. She would like to cluster some of  this testing with her routine prenatal visits, if possible. She will consider twice weekly  testing as she gets further along, however.     Given these limitations and with shared decision making, we will plan for the following:  -  testing with MFM every 2 weeks.  -  testing with NST (or ideally modified BPP) with her primary OB every 2 weeks, alternating with MFM visits.  - Growth US in 4 weeks with MFM.     was seen today for prenatal care.    Diagnoses and all orders for this visit:    Multigravida of advanced maternal age in third trimester  -     Fetal Non Stress Test by Provider/RN    Insulin controlled gestational diabetes mellitus (GDM) in second trimester  -     UA Macroscopic with reflex to Microscopic and Culture - Clinic Collect  -     Fetal Non Stress Test by Provider/RN  -     UA Microscopic with Reflex to Culture    Limited prenatal care in second trimester       NON STRESS TEST  ---------------------------------------------------------------------------------------------------------  NST interpretation: reactive. Test duration 15 min. Baseline  bpm.  Baseline variability: moderate. Accelerations: present. Decelerations: absent. Uterine activity:  absent. CTG category: normal/reassuring Category I       Plan : Continue Lantus at 10 units every day follow-up weekly till plan induction around 39 weeks    weekly biophysical profile /NSTs each visit will do pelvic exam and GBS swab next visit    Ev Turcios MD

## 2024-11-19 ENCOUNTER — OFFICE VISIT (OUTPATIENT)
Dept: MATERNAL FETAL MEDICINE | Facility: CLINIC | Age: 49
End: 2024-11-19
Attending: OBSTETRICS & GYNECOLOGY
Payer: COMMERCIAL

## 2024-11-19 ENCOUNTER — HOSPITAL ENCOUNTER (OUTPATIENT)
Dept: ULTRASOUND IMAGING | Facility: CLINIC | Age: 49
Discharge: HOME | End: 2024-11-19
Attending: OBSTETRICS & GYNECOLOGY
Payer: COMMERCIAL

## 2024-11-19 DIAGNOSIS — O09.523 MULTIGRAVIDA OF ADVANCED MATERNAL AGE IN THIRD TRIMESTER: ICD-10-CM

## 2024-11-19 DIAGNOSIS — O24.414 INSULIN CONTROLLED GESTATIONAL DIABETES MELLITUS (GDM) IN THIRD TRIMESTER: Primary | ICD-10-CM

## 2024-11-19 DIAGNOSIS — O24.414 INSULIN CONTROLLED GESTATIONAL DIABETES MELLITUS (GDM) IN THIRD TRIMESTER: ICD-10-CM

## 2024-11-19 PROCEDURE — 76819 FETAL BIOPHYS PROFIL W/O NST: CPT

## 2024-11-19 NOTE — NURSING NOTE
Patient reports good fetal movement,  denies contractions, leaking of fluid, or bleeding.  Reports blood sugar values fasting around 90 and  post prandial controlled per patient.    SBAR given to KIMO GARCIA, see their note in Epic.

## 2024-11-19 NOTE — PROGRESS NOTES
Please see full imaging report from ViewPoint program under imaging tab.    Jasvir Harris MD  Maternal Fetal Medicine

## 2024-11-21 ENCOUNTER — PRENATAL OFFICE VISIT (OUTPATIENT)
Dept: FAMILY MEDICINE | Facility: CLINIC | Age: 49
End: 2024-11-21
Payer: COMMERCIAL

## 2024-11-21 VITALS — WEIGHT: 165 LBS | SYSTOLIC BLOOD PRESSURE: 100 MMHG | DIASTOLIC BLOOD PRESSURE: 68 MMHG | BODY MASS INDEX: 31.18 KG/M2

## 2024-11-21 DIAGNOSIS — O09.523 MULTIGRAVIDA OF ADVANCED MATERNAL AGE IN THIRD TRIMESTER: Primary | ICD-10-CM

## 2024-11-21 DIAGNOSIS — O24.414 INSULIN CONTROLLED GESTATIONAL DIABETES MELLITUS (GDM) IN SECOND TRIMESTER: ICD-10-CM

## 2024-11-21 NOTE — PROGRESS NOTES
Patient who currently at 35w1d . insulin dependent GDM.  Prenatal flowsheet information is reviewed.  Discussed PTL, PROM, and when to call or come in.  5 - 1 - 1 rule out labor discussed with the patient and when to reach out  Discussed kick counts and fetal movement.  Reportable signs and symptoms discussed.   Taking her baby aspirin and trying to follow diabetic diet  OB History    Para Term  AB Living   5 3 3 0 1 4   SAB IAB Ectopic Multiple Live Births   1 0 0 1 4      # Outcome Date GA Lbr Tano/2nd Weight Sex Type Anes PTL Lv   5 Current            4 Term 21 37w6d 09:05 / 00:14 3.419 kg (7 lb 8.6 oz) F Vag-Spont EPI N MELECIO      Name: JOSEFEMALE-SE      Apgar1: 8  Apgar5: 9   3A Term 20 37w1d  2.792 kg (6 lb 2.5 oz) F CS-LTranv  N MELECIO      Name: [place]      Apgar1: 9  Apgar5: 9   3B Term 20 37w1d   M CS-LTranv   MELECIO      Birth Comments: di/di twins      Complications: Meconium staining   2 2017 12w0d   U   N FD      Birth Comments: Denies need for interventions   1 Term 14   2.81 kg (6 lb 3.1 oz) F Vag-Spont EPI  MELECIO      Name: Adrea      Advanced maternal age  Patient met with both MFM as well as genetic provider: Had comprehensive ultrasound done with MFM overall within normal limit     GDM insulin dependent :  Failed 1 hr GTT-156 started on lantus 10 units last visit   No monae with diabetic educator as she following nurse at the [facility] who is helping monitor her blood sugars , currently her FBS<95 and PP 1-2 yr sugars 140-160    MFM visit :  Discussed recommendations for surveillance in the setting of GDMA2 on insulin. Given increased risk for stillbirth with GDMA2, recommend twice weekly   surveillance beginning at 32 weeks. Se notes that coming twice a week is not feasible for her at this time. She works full time and also has four children at home for which  she needs to get childcare for these appointments. Given that she is unable to come  twice weekly, which is my medical recommendation, we discussed the option of  weekly surveillance as an alternative. She states that this is still difficult given that she has to attend prenatal visits with her clinic as well. She would like to cluster some of  this testing with her routine prenatal visits, if possible. She will consider twice weekly  testing as she gets further along, however.     Given these limitations and with shared decision making, we will plan for the following:  -  testing with MFM every 2 weeks.  -  testing with NST (or ideally modified BPP) with her primary OB every 2 weeks, alternating with MFM visits.  - Growth US in 4 weeks with MFM.    Diagnoses and all orders for this visit:    Multigravida of advanced maternal age in third trimester       NON STRESS TEST  ---------------------------------------------------------------------------------------------------------  NST interpretation: reactive. Test duration 15 min. Baseline 's bpm.  Baseline variability: moderate. Accelerations: present. Decelerations: absent. Uterine activity:  absent. CTG category: normal/reassuring Category I (quite a lot baby movement and off the monitor while in the room alone )       Plan : Continue Lantus at 10 units every day follow-up weekly till plan induction around 39 weeks    Weekly biophysical profile /NSTs each visit   Ev Turcios MD

## 2024-11-27 ENCOUNTER — OFFICE VISIT (OUTPATIENT)
Dept: MATERNAL FETAL MEDICINE | Facility: CLINIC | Age: 49
End: 2024-11-27
Attending: STUDENT IN AN ORGANIZED HEALTH CARE EDUCATION/TRAINING PROGRAM
Payer: COMMERCIAL

## 2024-11-27 ENCOUNTER — HOSPITAL ENCOUNTER (OUTPATIENT)
Dept: ULTRASOUND IMAGING | Facility: CLINIC | Age: 49
Discharge: HOME | End: 2024-11-27
Attending: STUDENT IN AN ORGANIZED HEALTH CARE EDUCATION/TRAINING PROGRAM
Payer: COMMERCIAL

## 2024-11-27 DIAGNOSIS — O24.410 DIET CONTROLLED GESTATIONAL DIABETES MELLITUS (GDM) IN THIRD TRIMESTER: ICD-10-CM

## 2024-11-27 DIAGNOSIS — O09.523 MULTIGRAVIDA OF ADVANCED MATERNAL AGE IN THIRD TRIMESTER: Primary | ICD-10-CM

## 2024-11-27 DIAGNOSIS — O24.414 INSULIN CONTROLLED GESTATIONAL DIABETES MELLITUS (GDM) IN THIRD TRIMESTER: ICD-10-CM

## 2024-11-27 PROCEDURE — 76816 OB US FOLLOW-UP PER FETUS: CPT

## 2024-11-27 PROCEDURE — 76819 FETAL BIOPHYS PROFIL W/O NST: CPT

## 2024-11-27 NOTE — NURSING NOTE
Patient reports active fetal movement, denies pain, contractions, leaking of fluid, or bleeding.  Reports blood sugar values fasting are in range and one hr post prandial remain elevated at times. Still taking 10units HS.  Patient denies headache, visual changes, nausea/vomiting, epigastric pain related to preeclampsia.  SBAR given to KIMO GARCIA, see their note in Epic.

## 2024-12-03 ENCOUNTER — OFFICE VISIT (OUTPATIENT)
Dept: FAMILY MEDICINE | Facility: CLINIC | Age: 49
End: 2024-12-03
Payer: COMMERCIAL

## 2024-12-03 VITALS
WEIGHT: 168.1 LBS | DIASTOLIC BLOOD PRESSURE: 66 MMHG | HEART RATE: 93 BPM | HEIGHT: 61 IN | BODY MASS INDEX: 31.74 KG/M2 | RESPIRATION RATE: 14 BRPM | SYSTOLIC BLOOD PRESSURE: 100 MMHG | OXYGEN SATURATION: 99 % | TEMPERATURE: 97.1 F

## 2024-12-03 DIAGNOSIS — O24.414 INSULIN CONTROLLED GESTATIONAL DIABETES MELLITUS (GDM) IN SECOND TRIMESTER: ICD-10-CM

## 2024-12-03 DIAGNOSIS — O09.523 MULTIGRAVIDA OF ADVANCED MATERNAL AGE IN THIRD TRIMESTER: Primary | ICD-10-CM

## 2024-12-03 PROCEDURE — 99207 PR PRENATAL VISIT: CPT | Performed by: FAMILY MEDICINE

## 2024-12-03 NOTE — PATIENT INSTRUCTIONS
Instructions for the day your induction is scheduled:    Dear     Your induction is scheduled for 2024  at 4PM call labor and delivery at [facility] # [phone] around 3PM before you go     The charge nurse will assess the number of patients on Labor & Delivery at the time you call.  Depending on how many patients are there, the charge nurse will then tell you whether you are able to go to Labor & Delivery at that time, whether you should arrive later in the day, or whether you should call back later.    Sometimes Labor & Delivery is full and your induction has to be delayed until the following day.      When you reach the hospital you will be started on IV drip with the medication called Pitocin to help start contractions.  most of the things during labor managed by the our very skilled nurses under our supervision and if you OK we also involve our residents , if you have any questions regarding this process please feel free to let me know .    Ev Turcios MD    Week 39 of Your Pregnancy: Care Instructions     babies can look different than what you see in pictures or movies. Their heads can be a strange shape right after birth. And they may have swollen eyes and red marks on their faces.   You can still get pregnant even if you are breastfeeding. If you don't want to get pregnant, talk to your doctor about birth control.   Tips for week 39 of pregnancy  If you plan to breastfeed, get prepared.       Continue to eat healthy foods.  Keep taking your prenatal vitamins during breastfeeding if your doctor recommends it.  Talk to your doctor before taking any medicines or supplements.  Choose the right birth control for you.       Intrauterine devices (IUDs) are placed in the uterus. Sometimes the IUD can be placed right after giving birth. They work for years.  Hormonal implants are placed under the skin of the arm. They also work for years.  Depo-Provera is a shot. You get it every 3 months.  Birth  "control pills can be used. They're taken every day.  Tubal ligation (tying your tubes) and vasectomy are surgeries. They're permanent.  Diaphragms, spermicide, and condoms must be used each time you have sex. If you used a diaphragm before, you should get refitted after the baby is born.  A birth control patch or ring can be used. These just can't be started until several weeks after you give birth.  Follow-up care is a key part of your treatment and safety. Be sure to make and go to all appointments, and call your doctor if you are having problems. It's also a good idea to know your test results and keep a list of the medicines you take.  Where can you learn more?  Go to https://www.AlphaSmart.net/patiented  Enter A811 in the search box to learn more about \"Week 39 of Your Pregnancy: Care Instructions.\"  Current as of: July 10, 2023  Content Version: 14.2 2024 [employer].   Care instructions adapted under license by your healthcare professional. If you have questions about a medical condition or this instruction, always ask your healthcare professional. [employer] disclaims any warranty or liability for your use of this information.    Labor Induction  What You Need to Know  What is labor induction?  In most cases, it is best to go into labor naturally. When labor does not start on its own, we may use medicine or other methods to start (induce) labor. This is called induction, which:  Helps the uterus contract  Thins, softens and opens the cervix (opening of the uterus)  When is induction used?  There are two types of induction.  Medical induction may be done to protect the health of the parent or baby if:  There are medical concerns for you or your baby.  You haven't had your baby by 41 weeks.  Induction for non-medical reasons may be done at 39 weeks or later if:  You live far from the hospital.  You've been having contractions for many days.  You've given birth quickly in the past. " "  We will not perform an induction for non-medical reasons before 39 weeks. Studies show that babies born before 39 weeks may struggle with breathing, feeding, sleeping and staying warm. They are more likely to have health problems and may need to stay in the hospital longer. If we start your labor for medical reasons, the benefits will outweigh these risks. We will talk to you about your risks, benefits and alternatives (other options) before we start your labor.  How is induction done?  We may start your labor by doing one or more of the following:  We may need to help your cervix soften and open (sometimes called \"ripening\") to prepare it for labor. There are two ways to do this:  Medicines--these may be in the form of pills that you swallow or medicines that are put into the vagina next to the cervix.  Mechanical--using either a single or double balloon. These are small balloons that are attached to tubes that go up inside the cervix. The balloons are then filled with water to put pressure on the cervix and help the cervix soften and open. Depending on the type of balloon used, you may be allowed to go home after it is placed.  After your cervix is ready:  We may give you medicine through an IV (a small tube placed in your vein). This medicine is called Pitocin. It helps the muscles of your uterus to contract.  We may make a small opening in your bag of water (the sac around your baby). This is called an amniotomy. Sometimes called \"breaking the water\". It may help your uterus contract and your cervix open.  What might happen if my labor is induced?  Some of this depends on how your labor is started and how your body responds. Your labor may be more complicated. You and your baby may need more medical treatments. In general:  You may not go into labor right away. If so, we may send you home with follow-up instructions.  It will be important to monitor you and your baby during the induction.  You may not be able " to move around during labor. You will have two belts with monitors attached to your belly. These allow the nurse to watch your contractions and your baby's heart rate.  Your labor may take longer than if you went into labor on your own. It might take more than one day.  If you need cervical ripening, it is important to know that it may be many hours (even days) until delivery happens.  Cervical ripening can be slow and require several doses before your body is ready to labor.  A long labor may increase the risk of infection in mother and baby.  Your labor may not progress as planned. This could lead to a  birth.  Can I plan the date of my delivery?  After 39 weeks, you may ask about planning your delivery date. This is only an option if your body--and your baby--are ready. To find out, we will check your cervix and your baby's heart rate.   If you are ready to be induced, we will give you a date and time to come to the hospital. If many patients are in labor that day, we may need to start your labor at another time.  If you are not ready, we will not start your labor. It will be safer for your baby to come on its own.  What else do I need to know?  Before you have an induction, make sure you understand the reasons, risks and benefits. Ask your doctor or nurse-midwife:  Why do I need to be induced?  What are the risks to my baby?  How will you start my labor?  How will you know if my baby is ready to be born?  How will you know if my body is ready to give birth?  Where can I get more information?  To learn more about induction, you may visit these websites:  The American College of Nurse-Midwives:  www.mymidwife.org  The American College of Obstetricians and Gynecologists: www.acog.org  Childbirth Connection:  www.childbirthconnection.org   Dimes: www.Sykio.CancerIQ  Association of Women's Health, Obstetrics, and  Nursing  www.nniuet2zcl.org/go-the-full-40&#047;  For informational purposes  only. Not to replace the advice of your health care provider. Copyright   2008 [employer]. All rights reserved. Clinically reviewed by the  System Operations Leadership team. [employer] 088987 - REV .

## 2024-12-03 NOTE — PROGRESS NOTES
Patient who currently at 35w1d . insulin dependent GDM.  Prenatal flowsheet information is reviewed.  Discussed PTL, PROM, and when to call or come in.  5 - 1 - 1 rule out labor discussed with the patient and when to reach out  Discussed kick counts and fetal movement.  Reportable signs and symptoms discussed.   Taking her baby aspirin and trying to follow diabetic diet  OB History    Para Term  AB Living   5 3 3 0 1 4   SAB IAB Ectopic Multiple Live Births   1 0 0 1 4      # Outcome Date GA Lbr Tano/2nd Weight Sex Type Anes PTL Lv   5 Current            4 Term 21 37w6d 09:05 / 00:14 3.419 kg (7 lb 8.6 oz) F Vag-Spont EPI N MELECIO      Name: JOSEFEMALE-SE      Apgar1: 8  Apgar5: 9   3A Term 20 37w1d  2.792 kg (6 lb 2.5 oz) F CS-LTranv  N MELECIO      Name: [place]      Apgar1: 9  Apgar5: 9   3B Term 20 37w1d   M CS-LTranv   MELECIO      Birth Comments: di/di twins      Complications: Meconium staining   2 2017 12w0d   U   N FD      Birth Comments: Denies need for interventions   1 Term 14   2.81 kg (6 lb 3.1 oz) F Vag-Spont EPI  MELECIO      Name: Adrea      Advanced maternal age  Patient met with both MFM as well as genetic provider: Had comprehensive ultrasound done with MFM overall within normal limit     GDM insulin dependent :  Failed 1 hr GTT-156 started on lantus 10 units last visit   No monae with diabetic educator as she following nurse at the [facility] who is helping monitor her blood sugars , currently her FBS<95 and PP 1-2 yr sugars 140-160    MFM visit :  Discussed recommendations for surveillance in the setting of GDMA2 on insulin. Given increased risk for stillbirth with GDMA2, recommend twice weekly   surveillance beginning at 32 weeks. Se notes that coming twice a week is not feasible for her at this time. She works full time and also has four children at home for which  she needs to get childcare for these appointments. Given that she is unable to come  twice weekly, which is my medical recommendation, we discussed the option of  weekly surveillance as an alternative. She states that this is still difficult given that she has to attend prenatal visits with her clinic as well. She would like to cluster some of  this testing with her routine prenatal visits, if possible. She will consider twice weekly  testing as she gets further along, however.     Given these limitations and with shared decision making, we will plan for the following:  -  testing with MFM every 2 weeks.  -  testing with NST (or ideally modified BPP) with her primary OB every 2 weeks, alternating with MFM visits.  - Growth US in 4 weeks with MFM.    Diagnoses and all orders for this visit:    Multigravida of advanced maternal age in third trimester    Insulin controlled gestational diabetes mellitus (GDM) in second trimester       NON STRESS TEST  ---------------------------------------------------------------------------------------------------------  NST interpretation: reactive. Test duration 15 min. Baseline 's bpm.  Baseline variability: moderate. Accelerations: present. Decelerations: absent. Uterine activity:  absent. CTG category: normal/reassuring Category I        Plan : Continue Lantus at 10 units every day follow-up Weekly . Scheduled for biophysical profile   NSTs each visit .    Ev Turcios MD

## 2024-12-06 ENCOUNTER — HOSPITAL ENCOUNTER (OUTPATIENT)
Dept: ULTRASOUND IMAGING | Facility: CLINIC | Age: 49
Discharge: HOME | End: 2024-12-06
Attending: FAMILY MEDICINE | Admitting: FAMILY MEDICINE
Payer: COMMERCIAL

## 2024-12-06 DIAGNOSIS — O09.523 MULTIGRAVIDA OF ADVANCED MATERNAL AGE IN THIRD TRIMESTER: ICD-10-CM

## 2024-12-06 DIAGNOSIS — O24.414 INSULIN CONTROLLED GESTATIONAL DIABETES MELLITUS (GDM) IN SECOND TRIMESTER: ICD-10-CM

## 2024-12-06 PROCEDURE — 76819 FETAL BIOPHYS PROFIL W/O NST: CPT

## 2024-12-06 PROCEDURE — 76816 OB US FOLLOW-UP PER FETUS: CPT

## 2024-12-10 ENCOUNTER — OFFICE VISIT (OUTPATIENT)
Dept: FAMILY MEDICINE | Facility: CLINIC | Age: 49
End: 2024-12-10
Payer: COMMERCIAL

## 2024-12-10 VITALS
SYSTOLIC BLOOD PRESSURE: 100 MMHG | DIASTOLIC BLOOD PRESSURE: 60 MMHG | BODY MASS INDEX: 31.68 KG/M2 | HEART RATE: 92 BPM | HEIGHT: 61 IN | RESPIRATION RATE: 14 BRPM | WEIGHT: 167.8 LBS | TEMPERATURE: 97.7 F | OXYGEN SATURATION: 98 %

## 2024-12-10 DIAGNOSIS — O09.523 MULTIGRAVIDA OF ADVANCED MATERNAL AGE IN THIRD TRIMESTER: Primary | ICD-10-CM

## 2024-12-10 DIAGNOSIS — O24.414 INSULIN CONTROLLED GESTATIONAL DIABETES MELLITUS (GDM) IN SECOND TRIMESTER: ICD-10-CM

## 2024-12-10 PROCEDURE — 99207 PR PRENATAL VISIT: CPT | Performed by: FAMILY MEDICINE

## 2024-12-10 PROCEDURE — 59025 FETAL NON-STRESS TEST: CPT | Performed by: FAMILY MEDICINE

## 2024-12-10 NOTE — PROGRESS NOTES
Patient who currently at 35w1d . insulin dependent GDM.  Prenatal flowsheet information is reviewed.  Discussed PTL, PROM, and when to call or come in.  5 - 1 - 1 rule out labor discussed with the patient and when to reach out  Discussed kick counts and fetal movement.  Reportable signs and symptoms discussed.   Taking her baby aspirin and trying to follow diabetic diet  OB History    Para Term  AB Living   5 3 3 0 1 4   SAB IAB Ectopic Multiple Live Births   1 0 0 1 4      # Outcome Date GA Lbr Tano/2nd Weight Sex Type Anes PTL Lv   5 Current            4 Term 21 37w6d 09:05 / 00:14 3.419 kg (7 lb 8.6 oz) F Vag-Spont EPI N MELECIO      Name: JOSEFEMALE-SE      Apgar1: 8  Apgar5: 9   3A Term 20 37w1d  2.792 kg (6 lb 2.5 oz) F CS-LTranv  N MELECIO      Name: [place]      Apgar1: 9  Apgar5: 9   3B Term 20 37w1d   M CS-LTranv   MELECIO      Birth Comments: di/di twins      Complications: Meconium staining   2 2017 12w0d   U   N FD      Birth Comments: Denies need for interventions   1 Term 14   2.81 kg (6 lb 3.1 oz) F Vag-Spont EPI  MELECIO      Name: Adrea      Advanced maternal age  Patient met with both MFM as well as genetic provider: Had comprehensive ultrasound done with MFM overall within normal limit     GDM insulin dependent :  Failed 1 hr GTT-156 started on lantus 10 units last visit   No monae with diabetic educator as she following nurse at the [facility] who is helping monitor her blood sugars , currently her FBS<95 and PP 1-2 yr sugars 140-160    MFM visit :  Discussed recommendations for surveillance in the setting of GDMA2 on insulin. Given increased risk for stillbirth with GDMA2, recommend twice weekly   surveillance beginning at 32 weeks. Se notes that coming twice a week is not feasible for her at this time. She works full time and also has four children at home for which  she needs to get childcare for these appointments. Given that she is unable to come  twice weekly, which is my medical recommendation, we discussed the option of  weekly surveillance as an alternative. She states that this is still difficult given that she has to attend prenatal visits with her clinic as well. She would like to cluster some of  this testing with her routine prenatal visits, if possible. She will consider twice weekly  testing as she gets further along, however.     Given these limitations and with shared decision making, we will plan for the following:  -  testing with MFM every 2 weeks.  -  testing with NST (or ideally modified BPP) with her primary OB every 2 weeks, alternating with MFM visits.  - Growth US in 4 weeks with MFM.     was seen today for prenatal care.    Diagnoses and all orders for this visit:    Multigravida of advanced maternal age in third trimester  -     Fetal Non Stress Test by Provider/RN  -     US Fetal Biophys Prof w/o Non Stress Test; Future    Insulin controlled gestational diabetes mellitus (GDM) in second trimester  -     Fetal Non Stress Test by Provider/RN  -     US Fetal Biophys Prof w/o Non Stress Test; Future       NON STRESS TEST  ---------------------------------------------------------------------------------------------------------  NST interpretation: reactive. Test duration 15 min. Baseline 's bpm.  Baseline variability: moderate. Accelerations: present. Decelerations: absent. Uterine activity:  absent. CTG category: normal/reassuring Category I        Plan : Continue Lantus at 10 units every day.   induction planned for 24 but patient like to wait for natural labor this wk as had bad experience with her induction with Twins   rescheduled Induction for 24  Will do do BPP this wk orders place     Ev Turcios MD

## 2024-12-13 ENCOUNTER — HOSPITAL ENCOUNTER (OUTPATIENT)
Dept: ULTRASOUND IMAGING | Facility: CLINIC | Age: 49
Discharge: HOME | End: 2024-12-13
Attending: FAMILY MEDICINE | Admitting: FAMILY MEDICINE
Payer: COMMERCIAL

## 2024-12-13 DIAGNOSIS — O09.523 MULTIGRAVIDA OF ADVANCED MATERNAL AGE IN THIRD TRIMESTER: ICD-10-CM

## 2024-12-13 DIAGNOSIS — O24.414 INSULIN CONTROLLED GESTATIONAL DIABETES MELLITUS (GDM) IN SECOND TRIMESTER: ICD-10-CM

## 2024-12-13 PROCEDURE — 76819 FETAL BIOPHYS PROFIL W/O NST: CPT

## 2024-12-16 ENCOUNTER — TELEPHONE (OUTPATIENT)
Dept: FAMILY MEDICINE | Facility: CLINIC | Age: 49
End: 2024-12-16
Payer: COMMERCIAL

## 2024-12-16 NOTE — TELEPHONE ENCOUNTER
DR ROBERTS WOULD LIKE TO SEE PT TOMORROW OR WEDNESDAY FOR PRENATAL VISIT, INDUCTION DATE HAS BEEN PUSHED OUT AND PCP WOULD LIKE TO COMPLETE NST SCREENING.     OKAY TO DOUBLE BOOK/ USE RESERVED SLOTS

## 2024-12-17 ENCOUNTER — OFFICE VISIT (OUTPATIENT)
Dept: FAMILY MEDICINE | Facility: CLINIC | Age: 49
End: 2024-12-17
Payer: COMMERCIAL

## 2024-12-17 VITALS
BODY MASS INDEX: 31.23 KG/M2 | OXYGEN SATURATION: 99 % | HEIGHT: 61 IN | RESPIRATION RATE: 12 BRPM | TEMPERATURE: 97.3 F | SYSTOLIC BLOOD PRESSURE: 94 MMHG | DIASTOLIC BLOOD PRESSURE: 64 MMHG | HEART RATE: 69 BPM | WEIGHT: 165.4 LBS

## 2024-12-17 DIAGNOSIS — O09.523 MULTIGRAVIDA OF ADVANCED MATERNAL AGE IN THIRD TRIMESTER: Primary | ICD-10-CM

## 2024-12-17 DIAGNOSIS — O24.414 INSULIN CONTROLLED GESTATIONAL DIABETES MELLITUS (GDM) IN SECOND TRIMESTER: ICD-10-CM

## 2024-12-17 PROCEDURE — 59025 FETAL NON-STRESS TEST: CPT | Performed by: FAMILY MEDICINE

## 2024-12-17 PROCEDURE — 99207 PR PRENATAL VISIT: CPT | Performed by: FAMILY MEDICINE

## 2024-12-17 NOTE — PROGRESS NOTES
Patient who currently at 40w1d . insulin dependent GDM.  Prenatal flowsheet information is reviewed.  Discussed PTL, PROM, and when to call or come in.  5 - 1 - 1 rule out labor discussed with the patient and when to reach out  Discussed kick counts and fetal movement.  Reportable signs and symptoms discussed.   Taking her baby aspirin and trying to follow diabetic diet  OB History    Para Term  AB Living   5 3 3 0 1 4   SAB IAB Ectopic Multiple Live Births   1 0 0 1 4      # Outcome Date GA Lbr Tano/2nd Weight Sex Type Anes PTL Lv   5 Current            4 Term 21 37w6d 09:05 / 00:14 3.419 kg (7 lb 8.6 oz) F Vag-Spont EPI N MELECIO      Name: JOSEFEMALE-SE      Apgar1: 8  Apgar5: 9   3A Term 20 37w1d  2.792 kg (6 lb 2.5 oz) F CS-LTranv  N MELECIO      Name: [place]      Apgar1: 9  Apgar5: 9   3B Term 20 37w1d   M CS-LTranv   MELECIO      Birth Comments: di/di twins      Complications: Meconium staining   2 2017 12w0d   U   N FD      Birth Comments: Denies need for interventions   1 Term 14   2.81 kg (6 lb 3.1 oz) F Vag-Spont EPI  MELECIO      Name: Adrea      Advanced maternal age  Patient met with both MFM as well as genetic provider: Had comprehensive ultrasound done with MFM overall within normal limit     GDM insulin dependent :  Failed 1 hr GTT-156 started on lantus 10 units last visit   No monae with diabetic educator as she following nurse at the [facility] who is helping monitor her blood sugars , currently her FBS<95 and PP 1-2 yr sugars 140-160    MFM visit :  Discussed recommendations for surveillance in the setting of GDMA2 on insulin. Given increased risk for stillbirth with GDMA2, recommend twice weekly   surveillance beginning at 32 weeks. Se notes that coming twice a week is not feasible for her at this time. She works full time and also has four children at home for which  she needs to get childcare for these appointments. Given that she is unable to come  twice weekly, which is my medical recommendation, we discussed the option of  weekly surveillance as an alternative. She states that this is still difficult given that she has to attend prenatal visits with her clinic as well. She would like to cluster some of  this testing with her routine prenatal visits, if possible. She will consider twice weekly  testing as she gets further along, however.     Given these limitations and with shared decision making, we will plan for the following:  -  testing with MFM every 2 weeks.  -  testing with NST (or ideally modified BPP) with her primary OB every 2 weeks, alternating with MFM visits.  - Growth US in 4 weeks with MFM.     was seen today for prenatal care.    Diagnoses and all orders for this visit:    Multigravida of advanced maternal age in third trimester  -     Fetal Non Stress Test by Provider/RN    Insulin controlled gestational diabetes mellitus (GDM) in second trimester       NON STRESS TEST  ---------------------------------------------------------------------------------------------------------  NST interpretation: reactive. Test duration 15 min. Baseline 's bpm.  Baseline variability: moderate. Accelerations: present. Decelerations: absent. Uterine activity:  absent. CTG category: normal/reassuring Category I        Plan : Continue Lantus at 10 units every day.   induction planned for 24 then  and now  as patient like to wait for natural labor this wk as had bad experience with her induction with Twins   rescheduled Induction for 24  Not able to do BPP this wk    Ev Turcios MD

## 2024-12-19 ENCOUNTER — HOSPITAL ENCOUNTER (INPATIENT)
Facility: CLINIC | Age: 49
End: 2024-12-19
Attending: FAMILY MEDICINE | Admitting: FAMILY MEDICINE
Payer: COMMERCIAL

## 2024-12-19 VITALS
DIASTOLIC BLOOD PRESSURE: 69 MMHG | RESPIRATION RATE: 16 BRPM | SYSTOLIC BLOOD PRESSURE: 101 MMHG | TEMPERATURE: 98.4 F | OXYGEN SATURATION: 99 % | HEIGHT: 61 IN | BODY MASS INDEX: 31.15 KG/M2 | HEART RATE: 77 BPM | WEIGHT: 165 LBS

## 2024-12-19 DIAGNOSIS — O09.523 MULTIGRAVIDA OF ADVANCED MATERNAL AGE IN THIRD TRIMESTER: Primary | ICD-10-CM

## 2024-12-19 LAB
ABO + RH BLD: NORMAL
ALBUMIN UR-MCNC: 20 MG/DL
APPEARANCE UR: ABNORMAL
BACTERIA #/AREA URNS HPF: ABNORMAL /HPF
BASOPHILS # BLD AUTO: 0 10E3/UL (ref 0–0.2)
BASOPHILS NFR BLD AUTO: 1 %
BILIRUB UR QL STRIP: NEGATIVE
BLD GP AB SCN SERPL QL: NEGATIVE
COLOR UR AUTO: ABNORMAL
EOSINOPHIL # BLD AUTO: 0.1 10E3/UL (ref 0–0.7)
EOSINOPHIL NFR BLD AUTO: 2 %
ERYTHROCYTE [DISTWIDTH] IN BLOOD BY AUTOMATED COUNT: 17.4 % (ref 10–15)
EST. AVERAGE GLUCOSE BLD GHB EST-MCNC: 157 MG/DL
GLUCOSE BLDC GLUCOMTR-MCNC: 110 MG/DL (ref 70–99)
GLUCOSE BLDC GLUCOMTR-MCNC: 118 MG/DL (ref 70–99)
GLUCOSE BLDC GLUCOMTR-MCNC: 65 MG/DL (ref 70–99)
GLUCOSE BLDC GLUCOMTR-MCNC: 70 MG/DL (ref 70–99)
GLUCOSE BLDC GLUCOMTR-MCNC: 91 MG/DL (ref 70–99)
GLUCOSE BLDC GLUCOMTR-MCNC: 94 MG/DL (ref 70–99)
GLUCOSE BLDC GLUCOMTR-MCNC: 95 MG/DL (ref 70–99)
GLUCOSE UR STRIP-MCNC: 100 MG/DL
HBA1C MFR BLD: 7.1 %
HCT VFR BLD AUTO: 29.9 % (ref 35–47)
HGB BLD-MCNC: 9.7 G/DL (ref 11.7–15.7)
HGB UR QL STRIP: ABNORMAL
HOLD SPECIMEN: NORMAL
IMM GRANULOCYTES # BLD: 0 10E3/UL
IMM GRANULOCYTES NFR BLD: 1 %
KETONES UR STRIP-MCNC: NEGATIVE MG/DL
LEUKOCYTE ESTERASE UR QL STRIP: ABNORMAL
LYMPHOCYTES # BLD AUTO: 1.5 10E3/UL (ref 0.8–5.3)
LYMPHOCYTES NFR BLD AUTO: 26 %
MCH RBC QN AUTO: 23 PG (ref 26.5–33)
MCHC RBC AUTO-ENTMCNC: 32.4 G/DL (ref 31.5–36.5)
MCV RBC AUTO: 71 FL (ref 78–100)
MONOCYTES # BLD AUTO: 0.5 10E3/UL (ref 0–1.3)
MONOCYTES NFR BLD AUTO: 9 %
MUCOUS THREADS #/AREA URNS LPF: PRESENT /LPF
NEUTROPHILS # BLD AUTO: 3.7 10E3/UL (ref 1.6–8.3)
NEUTROPHILS NFR BLD AUTO: 63 %
NITRATE UR QL: NEGATIVE
NRBC # BLD AUTO: 0 10E3/UL
NRBC BLD AUTO-RTO: 0 /100
PH UR STRIP: 6 [PH] (ref 5–7)
PLATELET # BLD AUTO: 248 10E3/UL (ref 150–450)
RBC # BLD AUTO: 4.21 10E6/UL (ref 3.8–5.2)
RBC URINE: 7 /HPF
SP GR UR STRIP: 1.02 (ref 1–1.03)
SPECIMEN EXP DATE BLD: NORMAL
SQUAMOUS EPITHELIAL: 13 /HPF
T PALLIDUM AB SER QL: NONREACTIVE
UROBILINOGEN UR STRIP-MCNC: <2 MG/DL
WBC # BLD AUTO: 5.9 10E3/UL (ref 4–11)
WBC URINE: 51 /HPF
YEAST #/AREA URNS HPF: ABNORMAL /HPF

## 2024-12-19 PROCEDURE — 250N000011 HC RX IP 250 OP 636: Performed by: ANESTHESIOLOGY

## 2024-12-19 PROCEDURE — 36415 COLL VENOUS BLD VENIPUNCTURE: CPT | Performed by: DIETITIAN, REGISTERED

## 2024-12-19 PROCEDURE — 83036 HEMOGLOBIN GLYCOSYLATED A1C: CPT | Performed by: DIETITIAN, REGISTERED

## 2024-12-19 PROCEDURE — 00HU33Z INSERTION OF INFUSION DEVICE INTO SPINAL CANAL, PERCUTANEOUS APPROACH: ICD-10-PCS | Performed by: ANESTHESIOLOGY

## 2024-12-19 PROCEDURE — 120N000001 HC R&B MED SURG/OB

## 2024-12-19 PROCEDURE — 258N000003 HC RX IP 258 OP 636: Performed by: DIETITIAN, REGISTERED

## 2024-12-19 PROCEDURE — 86900 BLOOD TYPING SEROLOGIC ABO: CPT | Performed by: DIETITIAN, REGISTERED

## 2024-12-19 PROCEDURE — 81001 URINALYSIS AUTO W/SCOPE: CPT | Performed by: DIETITIAN, REGISTERED

## 2024-12-19 PROCEDURE — 3E0R3BZ INTRODUCTION OF ANESTHETIC AGENT INTO SPINAL CANAL, PERCUTANEOUS APPROACH: ICD-10-PCS | Performed by: ANESTHESIOLOGY

## 2024-12-19 PROCEDURE — 250N000011 HC RX IP 250 OP 636: Performed by: DIETITIAN, REGISTERED

## 2024-12-19 PROCEDURE — 86780 TREPONEMA PALLIDUM: CPT | Performed by: DIETITIAN, REGISTERED

## 2024-12-19 PROCEDURE — 85025 COMPLETE CBC W/AUTO DIFF WBC: CPT | Performed by: DIETITIAN, REGISTERED

## 2024-12-19 PROCEDURE — 87086 URINE CULTURE/COLONY COUNT: CPT | Performed by: DIETITIAN, REGISTERED

## 2024-12-19 PROCEDURE — 250N000009 HC RX 250: Performed by: DIETITIAN, REGISTERED

## 2024-12-19 PROCEDURE — 87088 URINE BACTERIA CULTURE: CPT | Performed by: DIETITIAN, REGISTERED

## 2024-12-19 RX ORDER — METOCLOPRAMIDE HYDROCHLORIDE 5 MG/ML
10 INJECTION INTRAMUSCULAR; INTRAVENOUS EVERY 6 HOURS PRN
Status: DISCONTINUED | OUTPATIENT
Start: 2024-12-19 | End: 2024-12-20 | Stop reason: HOSPADM

## 2024-12-19 RX ORDER — METHYLERGONOVINE MALEATE 0.2 MG/ML
200 INJECTION INTRAVENOUS
Status: DISCONTINUED | OUTPATIENT
Start: 2024-12-19 | End: 2024-12-20 | Stop reason: HOSPADM

## 2024-12-19 RX ORDER — LOPERAMIDE HYDROCHLORIDE 2 MG/1
2 CAPSULE ORAL
Status: DISCONTINUED | OUTPATIENT
Start: 2024-12-19 | End: 2024-12-20 | Stop reason: HOSPADM

## 2024-12-19 RX ORDER — METOCLOPRAMIDE 10 MG/1
10 TABLET ORAL EVERY 6 HOURS PRN
Status: DISCONTINUED | OUTPATIENT
Start: 2024-12-19 | End: 2024-12-19 | Stop reason: HOSPADM

## 2024-12-19 RX ORDER — TERBUTALINE SULFATE 1 MG/ML
0.25 INJECTION, SOLUTION SUBCUTANEOUS
Status: DISCONTINUED | OUTPATIENT
Start: 2024-12-19 | End: 2024-12-20 | Stop reason: HOSPADM

## 2024-12-19 RX ORDER — NALOXONE HYDROCHLORIDE 0.4 MG/ML
0.2 INJECTION, SOLUTION INTRAMUSCULAR; INTRAVENOUS; SUBCUTANEOUS
Status: DISCONTINUED | OUTPATIENT
Start: 2024-12-19 | End: 2024-12-20 | Stop reason: HOSPADM

## 2024-12-19 RX ORDER — PROCHLORPERAZINE MALEATE 10 MG
10 TABLET ORAL EVERY 6 HOURS PRN
Status: DISCONTINUED | OUTPATIENT
Start: 2024-12-19 | End: 2024-12-20 | Stop reason: HOSPADM

## 2024-12-19 RX ORDER — OXYTOCIN/0.9 % SODIUM CHLORIDE 30/500 ML
340 PLASTIC BAG, INJECTION (ML) INTRAVENOUS CONTINUOUS PRN
Status: DISCONTINUED | OUTPATIENT
Start: 2024-12-19 | End: 2024-12-20 | Stop reason: HOSPADM

## 2024-12-19 RX ORDER — TRANEXAMIC ACID 10 MG/ML
1 INJECTION, SOLUTION INTRAVENOUS EVERY 30 MIN PRN
Status: DISCONTINUED | OUTPATIENT
Start: 2024-12-19 | End: 2024-12-20 | Stop reason: HOSPADM

## 2024-12-19 RX ORDER — NALOXONE HYDROCHLORIDE 0.4 MG/ML
0.4 INJECTION, SOLUTION INTRAMUSCULAR; INTRAVENOUS; SUBCUTANEOUS
Status: DISCONTINUED | OUTPATIENT
Start: 2024-12-19 | End: 2024-12-20 | Stop reason: HOSPADM

## 2024-12-19 RX ORDER — LOPERAMIDE HYDROCHLORIDE 2 MG/1
4 CAPSULE ORAL
Status: DISCONTINUED | OUTPATIENT
Start: 2024-12-19 | End: 2024-12-20 | Stop reason: HOSPADM

## 2024-12-19 RX ORDER — KETOROLAC TROMETHAMINE 30 MG/ML
30 INJECTION, SOLUTION INTRAMUSCULAR; INTRAVENOUS
Status: COMPLETED | OUTPATIENT
Start: 2024-12-19 | End: 2024-12-20

## 2024-12-19 RX ORDER — FENTANYL CITRATE-0.9 % NACL/PF 10 MCG/ML
100 PLASTIC BAG, INJECTION (ML) INTRAVENOUS EVERY 5 MIN PRN
Status: DISCONTINUED | OUTPATIENT
Start: 2024-12-19 | End: 2024-12-20 | Stop reason: HOSPADM

## 2024-12-19 RX ORDER — LIDOCAINE 40 MG/G
CREAM TOPICAL
Status: DISCONTINUED | OUTPATIENT
Start: 2024-12-19 | End: 2024-12-20 | Stop reason: HOSPADM

## 2024-12-19 RX ORDER — MISOPROSTOL 200 UG/1
800 TABLET ORAL
Status: DISCONTINUED | OUTPATIENT
Start: 2024-12-19 | End: 2024-12-20 | Stop reason: HOSPADM

## 2024-12-19 RX ORDER — NICOTINE POLACRILEX 4 MG
15-30 LOZENGE BUCCAL
Status: DISCONTINUED | OUTPATIENT
Start: 2024-12-19 | End: 2024-12-20 | Stop reason: HOSPADM

## 2024-12-19 RX ORDER — ONDANSETRON 4 MG/1
4 TABLET, ORALLY DISINTEGRATING ORAL EVERY 6 HOURS PRN
Status: DISCONTINUED | OUTPATIENT
Start: 2024-12-19 | End: 2024-12-19 | Stop reason: HOSPADM

## 2024-12-19 RX ORDER — ONDANSETRON 2 MG/ML
4 INJECTION INTRAMUSCULAR; INTRAVENOUS EVERY 6 HOURS PRN
Status: DISCONTINUED | OUTPATIENT
Start: 2024-12-19 | End: 2024-12-20 | Stop reason: HOSPADM

## 2024-12-19 RX ORDER — ONDANSETRON 4 MG/1
4 TABLET, ORALLY DISINTEGRATING ORAL EVERY 6 HOURS PRN
Status: DISCONTINUED | OUTPATIENT
Start: 2024-12-19 | End: 2024-12-20 | Stop reason: HOSPADM

## 2024-12-19 RX ORDER — CARBOPROST TROMETHAMINE 250 UG/ML
250 INJECTION, SOLUTION INTRAMUSCULAR
Status: DISCONTINUED | OUTPATIENT
Start: 2024-12-19 | End: 2024-12-20 | Stop reason: HOSPADM

## 2024-12-19 RX ORDER — CITRIC ACID/SODIUM CITRATE 334-500MG
30 SOLUTION, ORAL ORAL
Status: DISCONTINUED | OUTPATIENT
Start: 2024-12-19 | End: 2024-12-20 | Stop reason: HOSPADM

## 2024-12-19 RX ORDER — PENICILLIN G POTASSIUM 5000000 [IU]/1
5 INJECTION, POWDER, FOR SOLUTION INTRAMUSCULAR; INTRAVENOUS ONCE
Status: COMPLETED | OUTPATIENT
Start: 2024-12-19 | End: 2024-12-19

## 2024-12-19 RX ORDER — OXYTOCIN/0.9 % SODIUM CHLORIDE 30/500 ML
100-340 PLASTIC BAG, INJECTION (ML) INTRAVENOUS CONTINUOUS PRN
Status: DISCONTINUED | OUTPATIENT
Start: 2024-12-19 | End: 2024-12-21 | Stop reason: HOSPADM

## 2024-12-19 RX ORDER — ONDANSETRON 2 MG/ML
4 INJECTION INTRAMUSCULAR; INTRAVENOUS EVERY 6 HOURS PRN
Status: DISCONTINUED | OUTPATIENT
Start: 2024-12-19 | End: 2024-12-19 | Stop reason: HOSPADM

## 2024-12-19 RX ORDER — METOCLOPRAMIDE HYDROCHLORIDE 5 MG/ML
10 INJECTION INTRAMUSCULAR; INTRAVENOUS EVERY 6 HOURS PRN
Status: DISCONTINUED | OUTPATIENT
Start: 2024-12-19 | End: 2024-12-19 | Stop reason: HOSPADM

## 2024-12-19 RX ORDER — DEXTROSE MONOHYDRATE 100 MG/ML
INJECTION, SOLUTION INTRAVENOUS CONTINUOUS PRN
Status: DISCONTINUED | OUTPATIENT
Start: 2024-12-19 | End: 2024-12-20 | Stop reason: HOSPADM

## 2024-12-19 RX ORDER — NALBUPHINE HYDROCHLORIDE 10 MG/ML
2.5-5 INJECTION INTRAMUSCULAR; INTRAVENOUS; SUBCUTANEOUS EVERY 6 HOURS PRN
Status: DISCONTINUED | OUTPATIENT
Start: 2024-12-19 | End: 2024-12-21 | Stop reason: HOSPADM

## 2024-12-19 RX ORDER — PROCHLORPERAZINE MALEATE 10 MG
10 TABLET ORAL EVERY 6 HOURS PRN
Status: DISCONTINUED | OUTPATIENT
Start: 2024-12-19 | End: 2024-12-19 | Stop reason: HOSPADM

## 2024-12-19 RX ORDER — OXYTOCIN 10 [USP'U]/ML
10 INJECTION, SOLUTION INTRAMUSCULAR; INTRAVENOUS
Status: DISCONTINUED | OUTPATIENT
Start: 2024-12-19 | End: 2024-12-21 | Stop reason: HOSPADM

## 2024-12-19 RX ORDER — DEXTROSE MONOHYDRATE 25 G/50ML
25-50 INJECTION, SOLUTION INTRAVENOUS
Status: DISCONTINUED | OUTPATIENT
Start: 2024-12-19 | End: 2024-12-20 | Stop reason: HOSPADM

## 2024-12-19 RX ORDER — IBUPROFEN 800 MG/1
800 TABLET, FILM COATED ORAL
Status: COMPLETED | OUTPATIENT
Start: 2024-12-19 | End: 2024-12-20

## 2024-12-19 RX ORDER — MISOPROSTOL 200 UG/1
400 TABLET ORAL
Status: DISCONTINUED | OUTPATIENT
Start: 2024-12-19 | End: 2024-12-20 | Stop reason: HOSPADM

## 2024-12-19 RX ORDER — DOCUSATE SODIUM 100 MG/1
100 CAPSULE, LIQUID FILLED ORAL 2 TIMES DAILY
Status: DISCONTINUED | OUTPATIENT
Start: 2024-12-19 | End: 2024-12-19 | Stop reason: HOSPADM

## 2024-12-19 RX ORDER — OXYTOCIN/0.9 % SODIUM CHLORIDE 30/500 ML
1-24 PLASTIC BAG, INJECTION (ML) INTRAVENOUS CONTINUOUS
Status: DISCONTINUED | OUTPATIENT
Start: 2024-12-19 | End: 2024-12-20 | Stop reason: HOSPADM

## 2024-12-19 RX ORDER — SODIUM CHLORIDE, SODIUM LACTATE, POTASSIUM CHLORIDE, CALCIUM CHLORIDE 600; 310; 30; 20 MG/100ML; MG/100ML; MG/100ML; MG/100ML
INJECTION, SOLUTION INTRAVENOUS CONTINUOUS PRN
Status: DISCONTINUED | OUTPATIENT
Start: 2024-12-19 | End: 2024-12-20 | Stop reason: HOSPADM

## 2024-12-19 RX ORDER — METOCLOPRAMIDE 10 MG/1
10 TABLET ORAL EVERY 6 HOURS PRN
Status: DISCONTINUED | OUTPATIENT
Start: 2024-12-19 | End: 2024-12-20 | Stop reason: HOSPADM

## 2024-12-19 RX ORDER — PENICILLIN G 3000000 [IU]/50ML
3 INJECTION, SOLUTION INTRAVENOUS EVERY 4 HOURS
Status: DISCONTINUED | OUTPATIENT
Start: 2024-12-19 | End: 2024-12-20 | Stop reason: HOSPADM

## 2024-12-19 RX ORDER — OXYTOCIN 10 [USP'U]/ML
10 INJECTION, SOLUTION INTRAMUSCULAR; INTRAVENOUS
Status: DISCONTINUED | OUTPATIENT
Start: 2024-12-19 | End: 2024-12-20 | Stop reason: HOSPADM

## 2024-12-19 RX ORDER — FENTANYL/ROPIVACAINE/NS/PF 2MCG/ML-.1
PLASTIC BAG, INJECTION (ML) EPIDURAL
Status: DISCONTINUED | OUTPATIENT
Start: 2024-12-19 | End: 2024-12-20 | Stop reason: HOSPADM

## 2024-12-19 RX ADMIN — Medication 2 MILLI-UNITS/MIN: at 09:38

## 2024-12-19 RX ADMIN — PENICILLIN G 3 MILLION UNITS: 3000000 INJECTION, SOLUTION INTRAVENOUS at 15:59

## 2024-12-19 RX ADMIN — SODIUM CHLORIDE, POTASSIUM CHLORIDE, SODIUM LACTATE AND CALCIUM CHLORIDE: 600; 310; 30; 20 INJECTION, SOLUTION INTRAVENOUS at 09:37

## 2024-12-19 RX ADMIN — Medication: at 20:48

## 2024-12-19 RX ADMIN — PENICILLIN G 3 MILLION UNITS: 3000000 INJECTION, SOLUTION INTRAVENOUS at 20:15

## 2024-12-19 RX ADMIN — PENICILLIN G POTASSIUM 5 MILLION UNITS: 5000000 POWDER, FOR SOLUTION INTRAMUSCULAR; INTRAPLEURAL; INTRATHECAL; INTRAVENOUS at 11:59

## 2024-12-19 ASSESSMENT — ACTIVITIES OF DAILY LIVING (ADL)
ADLS_ACUITY_SCORE: 24
CONCENTRATING,_REMEMBERING_OR_MAKING_DECISIONS_DIFFICULTY: NO
ADLS_ACUITY_SCORE: 24
ADLS_ACUITY_SCORE: 24
WEAR_GLASSES_OR_BLIND: YES
ADLS_ACUITY_SCORE: 24
VISION_MANAGEMENT: CONTACTS
ADLS_ACUITY_SCORE: 24

## 2024-12-19 NOTE — PROGRESS NOTES
Labor Progress Note    Assessment/Plan  49 year old  at 40w3d with prenatal history significant for AMA, PPH x 2, and anemia on oral iron who is admitted for IOL due to postdates and GDMA2 (10u insulin at bedtime).  Glucose is well-controlled so far for antepartum. TOLAC.  Pitocin currently at 12 mU/min. Making progress. GBS+, penicillin started at 1200. Cat 1.    - Continue to monitor FHR  - Switch to intrapartum insulin algorithm once 6cm or epidural placed, whichever is sooner  - Anticipate     Subjective  Sitting on birthing ball. Starting to feel contractions more, but able to tolerate it and will wait a bit longer before epidural.      Objective  Vital signs in last 24 hours  Temp:  [97.4  F (36.3  C)] 97.4  F (36.3  C)  Pulse:  [77] 77  Resp:  [16] 16  BP: (117)/(74) 117/74  SpO2:  [95 %] 95 %      Physical Exam  General:   Abd: Gravid, soft, nontender  Cervix: 3.5cm, 80% effaced, -1 station  FHR: Baseline 135/mod variability/+ accels/- decels; Category 1 tracing  [place]: Contractions Q 2-3 min  Extremities: no peripheral edema    Pertinent Labs   Lab Results   Component Value Date    ABORH B POS 2024    HGB 9.7 2024   Glucose 94 fasting, 118 at 1-hour postprandial.     Patient discussed with attending physician, Dr. Ev Turcios  , who agrees with the plan.     Vidya Hill MD PGY1 2024  [facility]

## 2024-12-19 NOTE — PROGRESS NOTES
OB resident updated on patient status. All significant patient history reviewed with resident as well as FHR tracing and SVE.  Resident states will round on patient, place H&P, place orders and connect with Dr. Turcios.

## 2024-12-19 NOTE — H&P
OBSTETRICS ADMISSION HISTORY & PHYSICAL  DATE OF ADMISSION: 2024  7:04 AM        Assessment and Plan:   Assessment:  Se Kaur is a 49 year old  at 40w3d admitted for IOL due to postdates and prenatal hx significant for AMA, anemia on oral iron, and GDMA2 (10u insulin at bedtime). Hgb 9.7 and A1c 7.1 on admission. Last EFW was 76%ile on . Hx of previous CS in  for twins, one successful  in . TOLAC consent signed on 10/9/24 -- will start induction w pitocin. She also has hx of PPH x2 in  (c/s) and  (retained placenta) both requiring blood transfusions. Membranes are intact. GBS status is positive.      Patient Active Problem List   Diagnosis    Insulin controlled gestational diabetes mellitus (GDM) in second trimester    Anemia    Borderline high cholesterol    Low vitamin D level    Pre-diabetes    Perimenopause    Encounter to establish care    History of gestational diabetes    Multigravida of advanced maternal age in third trimester    Limited prenatal care in second trimester        PLAN:   Admit - see IP orders  Labor induction with Pitocin  Type/screen/platelets   Consent for blood products  Anticipate   Prepare for potential shoulder dystocia & PPH  GBS: positive. Antibiotics are indicated once in active labor  Comfort plan: nitrous oxide gas, epidural when patient is ready  Will monitor labor progress along with RN and update attending physician Dr. Ev Turcios.    Patient discussed with attending physician, Dr. Ev Turcios  , who agrees with the plan.     Vidya Hill MD PGY-1,  2024  [facility]         Chief Complaint:     Scheduled IOL       History of Present Illness:     Se Kaur is a 49 year old year old  at 40w3d confirmed by 18w1d US. Patient received prenatal care with Ev Turcios at [facility].    Presents to the [facility] for induction of labor, indication post-dates and  "GDMA2.    She reports irregular contractions that feel like Scott Borjas. She denies fluid leakage. She denies bleeding per vagina. Fetal movement is normal.    She has had anemia this pregnancy and was prescribed oral iron which she is taking \"inconsistently\" 2-3x/week because it gives her constipation. She has also had some nausea but this has been normal for the pregnancy. Denies issues w blood pressure.    She has been taking 10u insulin each night and states her blood sugars have been mostly controlled although they have been creeping up a bit more recently.    Her prenatal course has been complicated by  gestational diabetes, A2 (controlled on insulin or po meds), anemia, and AMA .    Prenatal labs   Lab Results   Component Value Date    AS Negative 2024    HEPBANG Nonreactive 2024    HGB 9.7 (L) 2024    GBS Negative 2021       GBS was collected on 24.  Weight gain during pregnancy: 8.165 kg (18 lb)         Obstetrical History:     OB History    Para Term  AB Living   5 3 3 0 1 4   SAB IAB Ectopic Multiple Live Births   1 0 0 1 4      # Outcome Date GA Lbr Tano/2nd Weight Sex Type Anes PTL Lv   5 Current            4 Term 21 37w6d 09:05 / 00:14 3.419 kg (7 lb 8.6 oz) F Vag-Spont EPI N MELECIO      Name: JOSE,FEMALE-SE      Apgar1: 8  Apgar5: 9   3A Term 20 37w1d  2.792 kg (6 lb 2.5 oz) F CS-LTranv  N MELECIO      Name: Hall      Apgar1: 9  Apgar5: 9   3B Term 20 37w1d   M CS-LTranv   MELECIO      Birth Comments: di/di twins      Complications: Meconium staining   2 SAB  12w0d   U   N FD      Birth Comments: Denies need for interventions   1 Term 14   2.81 kg (6 lb 3.1 oz) F Vag-Spont EPI  MELECIO      Name: Adrea              Immunzations:     Most Recent Immunizations   Administered Date(s) Administered    COVID-19 Monovalent 12+ (Pfizer ) 2022    Flu, Unspecified 09/15/2015    Influenza (H1N1) 10/30/2009    Influenza Vaccine >6 " months,quad, PF 2024    Influenza, Split Virus, Trivalent, Pf (Fluzone\Fluarix) 10/09/2024    Poliovirus, inactivated (IPV) 2010    RSV Vaccine (Abrysvo) 10/24/2024    TDAP (Adacel,Boostrix) 2024    Yellow Fever 2010     Tdap this pregnancy?  YES - Date: 24  Flu shot this pregnancy?YES - Date: 10/9/24  COVID vaccine? NO  RSV vaccine? YES - Date: 10/24/24         Past Medical History:     Past Medical History:   Diagnosis Date    Diabetes (H)     gestational diabetes    Dichorionic diamniotic twin pregnancy in third trimester     Diet controlled gestational diabetes mellitus (GDM), antepartum     Fx metatarsal     Incomplete miscarriage     Infertility, female     Low-lying placenta             Past Surgical History:     Past Surgical History:   Procedure Laterality Date    GYN SURGERY      C/S 20 months ago for twin gestation            Family History:     Family History   Problem Relation Age of Onset    Diabetes Type 2  Mother     Cerebrovascular Disease Father          Stroke    Hyperlipidemia Father     Cerebrovascular Disease Brother         2 strokes            Social History:   no tobacco use  no alcohol use  no illicit drug use         Medications:     Current Facility-Administered Medications   Medication Dose Route Frequency Provider Last Rate Last Admin    glucose gel 15-30 g  15-30 g Oral Q15 Min PRVidya Velasquez MD        Or    dextrose 50 % injection 25-50 mL  25-50 mL Intravenous Q15 Min PRVidya Velasquez MD        Or    glucagon injection 1 mg  1 mg Subcutaneous Q15 Min PRN Vidya Hill MD        docusate sodium (COLACE) capsule 100 mg  100 mg Oral BID Vidya Hill MD        lactated ringers infusion   Intravenous Continuous PRN Vidya Hill  mL/hr at 24 0937 New Bag at 24 0937    lidocaine (LMX4) cream   Topical Q1H PRN Vidya Hill MD        lidocaine 1 % 0.1-1 mL  0.1-1 mL Other Q1H PRN Vidya Hill MD        metoclopramide (REGLAN)  "tablet 10 mg  10 mg Oral Q6H PRN Vidya Hill MD        Or    metoclopramide (REGLAN) injection 10 mg  10 mg Intravenous Q6H PRN Vidya Hill MD        ondansetron (ZOFRAN ODT) ODT tab 4 mg  4 mg Oral Q6H PRN Vidya Hill MD        Or    ondansetron (ZOFRAN) injection 4 mg  4 mg Intravenous Q6H PRN Vidya Hill MD        oxytocin (PITOCIN) 30 units in 500 mL 0.9% NaCl infusion  1-24 moses-units/min Intravenous Continuous Vidya Hill MD 2 mL/hr at 12/19/24 0938 2 moses-units/min at 12/19/24 0938    prochlorperazine (COMPAZINE) tablet 10 mg  10 mg Oral Q6H PRN Vidya Hill MD        Or    prochlorperazine (COMPAZINE) injection 10 mg  10 mg Intravenous Q6H PRVidya Velasquez MD        sodium chloride (PF) 0.9% PF flush 3 mL  3 mL Intracatheter Q8H Vidya Hill MD        sodium chloride (PF) 0.9% PF flush 3 mL  3 mL Intracatheter q1 min prVidya Velasquez MD        terbutaline (BRETHINE) injection 0.25 mg  0.25 mg Subcutaneous Once PRN Vidya Hill MD                Allergies:   Patient has no known allergies.         Review of Systems:   EYES: no acute vision problems or changes  RESP: no significant cough, no shortness of breath  CV: no chest pain, no new or worsening peripheral edema  GI: +nausea, no vomiting, +constipation  : no dysuria  NEURO: no headaches         Physical Exam:   Vitals:   /74 (BP Location: Right arm, Patient Position: Semi-Knight's, Cuff Size: Adult Regular)   Pulse 77   Temp 97.4  F (36.3  C) (Oral)   Resp 16   Ht 1.549 m (5' 1\")   Wt 74.8 kg (165 lb)   LMP 03/11/2024   SpO2 95%   BMI 31.18 kg/m    165 lbs 0 oz  Estimated body mass index is 31.18 kg/m  as calculated from the following:    Height as of this encounter: 1.549 m (5' 1\").    Weight as of this encounter: 74.8 kg (165 lb).    GEN: Awake, alert in no apparent distress   HEENT: grossly normal  RESPIRATORY: clear to auscultation bilaterally, no increased work of breathing  CARDIOVASCULAR: RRR, no " murmur  ABDOMEN: gravid, soft, nontender  PELVIC:  no fluid noted, no blood noted.  Cervix: 1.5/%/-2  EXT:  no edema or calf tenderness  EFW on Exam: 3619g (76%ile) on 12/6  Confirmed VTX by Ultrasound? Yes on 12/13    Electronic Fetal Monitoring:  Baseline rate normal  Variability moderate  Accelerations present  Decelerations not present    Assessment: Category I EFM interpretation suggests absence of concern for fetal metabolic acidemia at this time due to decelerations absent, heart rate: normal baseline, and variability: moderate    Uterine Activity normal.    Strip reviewed remotely via [employer]

## 2024-12-20 LAB
ALBUMIN SERPL BCG-MCNC: 2.8 G/DL (ref 3.5–5.2)
ALP SERPL-CCNC: 211 U/L (ref 40–150)
ALT SERPL W P-5'-P-CCNC: 6 U/L (ref 0–50)
ANION GAP SERPL CALCULATED.3IONS-SCNC: 12 MMOL/L (ref 7–15)
AST SERPL W P-5'-P-CCNC: 24 U/L (ref 0–45)
BILIRUB SERPL-MCNC: 0.5 MG/DL
BUN SERPL-MCNC: 11.3 MG/DL (ref 6–20)
CALCIUM SERPL-MCNC: 8.9 MG/DL (ref 8.8–10.4)
CHLORIDE SERPL-SCNC: 106 MMOL/L (ref 98–107)
CREAT SERPL-MCNC: 0.8 MG/DL (ref 0.51–0.95)
EGFRCR SERPLBLD CKD-EPI 2021: 90 ML/MIN/1.73M2
ERYTHROCYTE [DISTWIDTH] IN BLOOD BY AUTOMATED COUNT: 17.5 % (ref 10–15)
GLUCOSE BLDC GLUCOMTR-MCNC: 133 MG/DL (ref 70–99)
GLUCOSE BLDC GLUCOMTR-MCNC: 135 MG/DL (ref 70–99)
GLUCOSE BLDC GLUCOMTR-MCNC: 70 MG/DL (ref 70–99)
GLUCOSE BLDC GLUCOMTR-MCNC: 72 MG/DL (ref 70–99)
GLUCOSE BLDC GLUCOMTR-MCNC: 78 MG/DL (ref 70–99)
GLUCOSE BLDC GLUCOMTR-MCNC: 84 MG/DL (ref 70–99)
GLUCOSE BLDC GLUCOMTR-MCNC: 88 MG/DL (ref 70–99)
GLUCOSE BLDC GLUCOMTR-MCNC: 88 MG/DL (ref 70–99)
GLUCOSE SERPL-MCNC: 135 MG/DL (ref 70–99)
HCO3 SERPL-SCNC: 14 MMOL/L (ref 22–29)
HCT VFR BLD AUTO: 30.6 % (ref 35–47)
HGB BLD-MCNC: 9.6 G/DL (ref 11.7–15.7)
HGB BLD-MCNC: 9.8 G/DL (ref 11.7–15.7)
MCH RBC QN AUTO: 22.5 PG (ref 26.5–33)
MCHC RBC AUTO-ENTMCNC: 32 G/DL (ref 31.5–36.5)
MCV RBC AUTO: 70 FL (ref 78–100)
PLATELET # BLD AUTO: 233 10E3/UL (ref 150–450)
PLATELET # BLD AUTO: 251 10E3/UL (ref 150–450)
POTASSIUM SERPL-SCNC: 4.3 MMOL/L (ref 3.4–5.3)
PROT SERPL-MCNC: 5.6 G/DL (ref 6.4–8.3)
RBC # BLD AUTO: 4.36 10E6/UL (ref 3.8–5.2)
SODIUM SERPL-SCNC: 132 MMOL/L (ref 135–145)
WBC # BLD AUTO: 17.3 10E3/UL (ref 4–11)

## 2024-12-20 PROCEDURE — 85014 HEMATOCRIT: CPT

## 2024-12-20 PROCEDURE — 85049 AUTOMATED PLATELET COUNT: CPT | Performed by: FAMILY MEDICINE

## 2024-12-20 PROCEDURE — 722N000001 HC LABOR CARE VAGINAL DELIVERY SINGLE

## 2024-12-20 PROCEDURE — 250N000011 HC RX IP 250 OP 636: Performed by: DIETITIAN, REGISTERED

## 2024-12-20 PROCEDURE — 120N000001 HC R&B MED SURG/OB

## 2024-12-20 PROCEDURE — 258N000003 HC RX IP 258 OP 636: Performed by: DIETITIAN, REGISTERED

## 2024-12-20 PROCEDURE — 82947 ASSAY GLUCOSE BLOOD QUANT: CPT

## 2024-12-20 PROCEDURE — 10D17Z9 MANUAL EXTRACTION OF PRODUCTS OF CONCEPTION, RETAINED, VIA NATURAL OR ARTIFICIAL OPENING: ICD-10-PCS | Performed by: FAMILY MEDICINE

## 2024-12-20 PROCEDURE — 250N000013 HC RX MED GY IP 250 OP 250 PS 637

## 2024-12-20 PROCEDURE — 85041 AUTOMATED RBC COUNT: CPT

## 2024-12-20 PROCEDURE — 36415 COLL VENOUS BLD VENIPUNCTURE: CPT

## 2024-12-20 PROCEDURE — 85018 HEMOGLOBIN: CPT | Performed by: FAMILY MEDICINE

## 2024-12-20 PROCEDURE — 250N000013 HC RX MED GY IP 250 OP 250 PS 637: Performed by: FAMILY MEDICINE

## 2024-12-20 PROCEDURE — 999N000016 HC STATISTIC ATTENDANCE AT DELIVERY

## 2024-12-20 PROCEDURE — 250N000009 HC RX 250: Performed by: DIETITIAN, REGISTERED

## 2024-12-20 PROCEDURE — 36415 COLL VENOUS BLD VENIPUNCTURE: CPT | Performed by: FAMILY MEDICINE

## 2024-12-20 PROCEDURE — 82247 BILIRUBIN TOTAL: CPT

## 2024-12-20 PROCEDURE — 250N000013 HC RX MED GY IP 250 OP 250 PS 637: Performed by: DIETITIAN, REGISTERED

## 2024-12-20 PROCEDURE — 250N000011 HC RX IP 250 OP 636: Performed by: ANESTHESIOLOGY

## 2024-12-20 RX ORDER — SODIUM CHLORIDE, SODIUM LACTATE, POTASSIUM CHLORIDE, CALCIUM CHLORIDE 600; 310; 30; 20 MG/100ML; MG/100ML; MG/100ML; MG/100ML
INJECTION, SOLUTION INTRAVENOUS CONTINUOUS
Status: DISCONTINUED | OUTPATIENT
Start: 2024-12-20 | End: 2024-12-21

## 2024-12-20 RX ORDER — MISOPROSTOL 200 UG/1
400 TABLET ORAL
Status: DISCONTINUED | OUTPATIENT
Start: 2024-12-20 | End: 2024-12-21 | Stop reason: HOSPADM

## 2024-12-20 RX ORDER — DIPHENHYDRAMINE HCL 25 MG
50 CAPSULE ORAL EVERY 6 HOURS PRN
Status: DISCONTINUED | OUTPATIENT
Start: 2024-12-20 | End: 2024-12-21 | Stop reason: HOSPADM

## 2024-12-20 RX ORDER — ACETAMINOPHEN 325 MG/1
650 TABLET ORAL EVERY 4 HOURS PRN
Status: DISCONTINUED | OUTPATIENT
Start: 2024-12-20 | End: 2024-12-21 | Stop reason: HOSPADM

## 2024-12-20 RX ORDER — METHYLERGONOVINE MALEATE 0.2 MG/ML
200 INJECTION INTRAVENOUS
Status: DISCONTINUED | OUTPATIENT
Start: 2024-12-20 | End: 2024-12-21 | Stop reason: HOSPADM

## 2024-12-20 RX ORDER — OXYTOCIN 10 [USP'U]/ML
10 INJECTION, SOLUTION INTRAMUSCULAR; INTRAVENOUS
Status: DISCONTINUED | OUTPATIENT
Start: 2024-12-20 | End: 2024-12-21 | Stop reason: HOSPADM

## 2024-12-20 RX ORDER — MISOPROSTOL 200 UG/1
800 TABLET ORAL
Status: DISCONTINUED | OUTPATIENT
Start: 2024-12-20 | End: 2024-12-21 | Stop reason: HOSPADM

## 2024-12-20 RX ORDER — LOPERAMIDE HYDROCHLORIDE 2 MG/1
2 CAPSULE ORAL
Status: DISCONTINUED | OUTPATIENT
Start: 2024-12-20 | End: 2024-12-21 | Stop reason: HOSPADM

## 2024-12-20 RX ORDER — DOCUSATE SODIUM 100 MG/1
100 CAPSULE, LIQUID FILLED ORAL DAILY
Status: DISCONTINUED | OUTPATIENT
Start: 2024-12-20 | End: 2024-12-21 | Stop reason: HOSPADM

## 2024-12-20 RX ORDER — CARBOPROST TROMETHAMINE 250 UG/ML
250 INJECTION, SOLUTION INTRAMUSCULAR
Status: DISCONTINUED | OUTPATIENT
Start: 2024-12-20 | End: 2024-12-21 | Stop reason: HOSPADM

## 2024-12-20 RX ORDER — HYDROCORTISONE 25 MG/G
CREAM TOPICAL 3 TIMES DAILY PRN
Status: DISCONTINUED | OUTPATIENT
Start: 2024-12-20 | End: 2024-12-21 | Stop reason: HOSPADM

## 2024-12-20 RX ORDER — TRANEXAMIC ACID 10 MG/ML
1 INJECTION, SOLUTION INTRAVENOUS EVERY 30 MIN PRN
Status: DISCONTINUED | OUTPATIENT
Start: 2024-12-20 | End: 2024-12-21 | Stop reason: HOSPADM

## 2024-12-20 RX ORDER — CALCIUM CARBONATE 500 MG/1
1000 TABLET, CHEWABLE ORAL DAILY PRN
Status: DISCONTINUED | OUTPATIENT
Start: 2024-12-20 | End: 2024-12-21 | Stop reason: HOSPADM

## 2024-12-20 RX ORDER — BISACODYL 10 MG
10 SUPPOSITORY, RECTAL RECTAL DAILY PRN
Status: DISCONTINUED | OUTPATIENT
Start: 2024-12-20 | End: 2024-12-21 | Stop reason: HOSPADM

## 2024-12-20 RX ORDER — MODIFIED LANOLIN
OINTMENT (GRAM) TOPICAL
Status: DISCONTINUED | OUTPATIENT
Start: 2024-12-20 | End: 2024-12-21 | Stop reason: HOSPADM

## 2024-12-20 RX ORDER — OXYTOCIN/0.9 % SODIUM CHLORIDE 30/500 ML
340 PLASTIC BAG, INJECTION (ML) INTRAVENOUS CONTINUOUS PRN
Status: DISCONTINUED | OUTPATIENT
Start: 2024-12-20 | End: 2024-12-21 | Stop reason: HOSPADM

## 2024-12-20 RX ORDER — LOPERAMIDE HYDROCHLORIDE 2 MG/1
4 CAPSULE ORAL
Status: DISCONTINUED | OUTPATIENT
Start: 2024-12-20 | End: 2024-12-21 | Stop reason: HOSPADM

## 2024-12-20 RX ORDER — SODIUM CHLORIDE, SODIUM LACTATE, POTASSIUM CHLORIDE, CALCIUM CHLORIDE 600; 310; 30; 20 MG/100ML; MG/100ML; MG/100ML; MG/100ML
INJECTION, SOLUTION INTRAVENOUS CONTINUOUS
Status: DISCONTINUED | OUTPATIENT
Start: 2024-12-20 | End: 2024-12-20

## 2024-12-20 RX ORDER — IBUPROFEN 800 MG/1
800 TABLET, FILM COATED ORAL EVERY 6 HOURS PRN
Status: DISCONTINUED | OUTPATIENT
Start: 2024-12-20 | End: 2024-12-21 | Stop reason: HOSPADM

## 2024-12-20 RX ADMIN — CALCIUM CARBONATE (ANTACID) CHEW TAB 500 MG 1000 MG: 500 CHEW TAB at 06:14

## 2024-12-20 RX ADMIN — TRANEXAMIC ACID 1 G: 10 INJECTION, SOLUTION INTRAVENOUS at 08:15

## 2024-12-20 RX ADMIN — Medication: at 05:14

## 2024-12-20 RX ADMIN — METHYLERGONOVINE MALEATE 200 MCG: 0.2 INJECTION, SOLUTION INTRAMUSCULAR; INTRAVENOUS at 08:06

## 2024-12-20 RX ADMIN — Medication 340 ML/HR: at 08:17

## 2024-12-20 RX ADMIN — METOCLOPRAMIDE HYDROCHLORIDE 10 MG: 5 INJECTION INTRAMUSCULAR; INTRAVENOUS at 02:03

## 2024-12-20 RX ADMIN — HYDROCORTISONE: 25 CREAM TOPICAL at 09:10

## 2024-12-20 RX ADMIN — DIPHENHYDRAMINE HYDROCHLORIDE 50 MG: 25 CAPSULE ORAL at 06:14

## 2024-12-20 RX ADMIN — PENICILLIN G 3 MILLION UNITS: 3000000 INJECTION, SOLUTION INTRAVENOUS at 00:12

## 2024-12-20 RX ADMIN — MISOPROSTOL 800 MCG: 200 TABLET ORAL at 08:08

## 2024-12-20 RX ADMIN — PENICILLIN G 3 MILLION UNITS: 3000000 INJECTION, SOLUTION INTRAVENOUS at 04:07

## 2024-12-20 RX ADMIN — IBUPROFEN 800 MG: 800 TABLET, FILM COATED ORAL at 09:10

## 2024-12-20 RX ADMIN — SODIUM CHLORIDE, POTASSIUM CHLORIDE, SODIUM LACTATE AND CALCIUM CHLORIDE: 600; 310; 30; 20 INJECTION, SOLUTION INTRAVENOUS at 03:33

## 2024-12-20 RX ADMIN — Medication: at 09:20

## 2024-12-20 RX ADMIN — DOCUSATE SODIUM 100 MG: 100 CAPSULE, LIQUID FILLED ORAL at 09:10

## 2024-12-20 ASSESSMENT — ACTIVITIES OF DAILY LIVING (ADL)
ADLS_ACUITY_SCORE: 23
ADLS_ACUITY_SCORE: 24
ADLS_ACUITY_SCORE: 24
ADLS_ACUITY_SCORE: 23
ADLS_ACUITY_SCORE: 23
ADLS_ACUITY_SCORE: 24
ADLS_ACUITY_SCORE: 23
ADLS_ACUITY_SCORE: 24
ADLS_ACUITY_SCORE: 23
ADLS_ACUITY_SCORE: 24
ADLS_ACUITY_SCORE: 24
ADLS_ACUITY_SCORE: 23
ADLS_ACUITY_SCORE: 24

## 2024-12-20 NOTE — PROGRESS NOTES
Labor assessment:     SROM clear fluid 1952 with pit at 24mu/hr since 1945. Pit decreased to 20 at 2035, pt vocal and breathing well through contractions. Bolus started for epidural. Epidural placed at 2045 and is now comfortable and pain well controlled. Araujo cath placed and SVE performed at 2141 is 4-5/80/-2.     GDM assessment and management:  After epidural placed and pt position to side lying position, BG checked and 65. Pt reports mild weakness at this time. Pt refused glucose gel, so 4 oz apple juice give. Repeat BG 15 min later is 70. Pt still reports feeling weakness and requesting more juice. 4 oz apple juice given. Repeat BG 15 min later (2200) is 110. Orders to recheck BG in 1 hour. Pt currently denying all s/s of hypoglycemia.

## 2024-12-20 NOTE — PROGRESS NOTES
Nirali q2-4min, palpating firm and soft at rest. Pit at 20mL/hr. Pain well controlled with epidural. SVE anterior lip. Dr. Turcios at bedside and orders received for Tums and benadryl to help reduce lip. Dr. Turcios discussed planned for bleeding PP and stated the plan will be to administer oxytocin, and if bleeding persists, then to administer methergine, then TXA. This RN expressed concern to Karolina related to high risk status of being on oxytocin for 21 hours at this point, TOLAC, hx of PPH, and suggested administering TXA just prior to delivery. Dr. Turcios declined and stated this is no longer best practice.

## 2024-12-20 NOTE — PLAN OF CARE
Goal Outcome Evaluation:     Report received at bedside with provider present. Provider checked patient, found her to be complete and initiated active pushing with patient. Patient pushed effectively and elivered a baby boy at 0805 with APGAR's of 7/9. Team called immediately after deliver as infant was blue and lacked tone. Patient given meds for blood loss as ordered,  see MAR. Patient is currently comfortable and resting.   Amy Hubbard RN on 2024 at 8:53 AM

## 2024-12-20 NOTE — PROGRESS NOTES
Brief Progress Note    Notified by RN of postpartum temperature of 101.4, previously afebrile prior to delivery.  Manual extraction of placental membranes performed during delivery. Blood pressure is also elevated 138-158 systolic and  diastolic in the setting of Methergine use postpartum.    -Will collect CMP, CBC, and urine protein creatinine ratio  -Administer Tylenol and monitor fever curve.  No antibiotics at this time  per Dr. Turcios    Patient discussed with attending physician, Dr. Turcios, who agrees with the plan.    Olena Rivera MD PGY3  [facility] Family Medicine Residency Program    Addendum 1130: CBC and CMP overall reassuring.  Urine protein creatinine unable to be collected postpartum due to bleeding.  Repeat blood pressures improved to 130s systolic.

## 2024-12-20 NOTE — PROGRESS NOTES
Brief Progress Note   2024 at 5:13 AM     Patient continues to feel no changes in pressure quality or location. She was able to rest in the last 3 hours. Continues to be on Pitocin. Cervical exam 9.5cm/%/-1-0.     Dr. Turcios was made aware.     Plan:   - Continue to monitor FHR   - Maternal positioning for cervical progression   - Anticipate      Scott Lomax DO   PGY1  [facility] Medicine Residency  2024

## 2024-12-20 NOTE — PROGRESS NOTES
Called to room for delivery. Dried and stimulated baby. Transitioned well SAT at 5min 90%. No further intervention required. Will follow as needed.   Location of patient: [place]    Subjective: Caller states thinks he has strep throat     Current Symptoms: Fever chills hard to swallow, hx strep throat back of throat with white patches and redenned    Onset: 5 days ago     Associated Symptoms: NA    Pain Severity: 6/10 throat    Temperature: Did not check ,thinks was running fever,does not think he has fever now    What has been tried: Tyl, ibu throat spray     Recommended disposition: See in Office [address] [facility] or [facility]V if no appt. Available Provided info per request for - [facility] 24/7    Care advice provided, patient verbalizes understanding; denies any other questions or concerns; instructed to call back for any new or worsening symptoms. Patient/caller agrees to follow-up with PCP     This triage is a result of a call to [address] [place] [address]. Please do not respond to the triage nurse through this encounter. Any subsequent communication should be directly with the patient.     Reason for Disposition   Patient wants to be seen    Protocols used: Sore Throat-ADULT-[place]

## 2024-12-20 NOTE — PROGRESS NOTES
Brief Progress Note   2024 at 8:22 PM     The patient had a ruptured of membranes at ~1945. Currently on 24 units of pitocin. Patient asked for an epidural. Cervical check via RN was 3.5/ 50%/-2, soft cervix.     Dr. Turcios was notified and aware of plan.     Plan:   - Continue to monitor FHR   - Switch to intrapartum insulin after epidural.   - Anticipate     Scott Lomax DO   PGY1  [facility] Medicine Residency  2024

## 2024-12-20 NOTE — L&D DELIVERY NOTE
[facility] Delivery Summary  [facility]  Date of Service: 2024    Name      Se Kaur         1975  MRN       [id number]  PCP        Ev Turcios     DELIVERY NARRATIVE    On 2024 Se Kaur delivered a viable male infant at 40w4d with apgars of 7 and 9 via , Spontaneous Delivery.  Prenatal course was notable for AMA,  GDMA2, PPH x2, prior  with successful  x1 and GBS positive.    Stage 1:  presented to Maternity Care on 2024 for IOL due for GDMA2. Her group B Strep (GBS) carrier status was positive. She received 5 intrapartum doses of IV penicillin. She received pitocin for induction/augmentation.     Stage 2: Delivery was via , spontaneous to a sterile field under epidural anesthesia. Infant delivered in vertex left occiput anterior position. Shoulders delivered without difficulty upon mother pushing with contraction. The baby was placed on the patient's abdomen and demonstrated a spontaneous cry and vigorous tone.  No resuscitation was needed.  Cord complications: nuchal. Non-delayed cord clamping was performed.  The cord was doubly clamped and cut 3 vessels were noted.     Stage 3: Placenta delivered intact at 2024  8:11 AM. Placental disposition was Hospital disposal. Fundal massage performed and fundus found to be firm. The following uterotonics were given: Pitocin (IV), Misoprostol (oral), and Methergine. Perineum, vagina, cervix were inspected, and the following lacerations were noted: None. EBL: 900 mL.    Excellent hemostasis was noted. Needle and sponge count correct. Infant and patient in delivery room in good and stable condition.     _________________    GA: 40w4d  GP:   Labor Complications: None  Additional Complications:    QBL:  TBD  Delivery Type: , Spontaneous  Duration of Ruptured Membranes: 12h 13m  GBS Status:   Group B Strep PCR   Date Value Ref Range Status   2024 Positive (A)  Negative Final     Comment:     ALERT: Streptococcus agalactiae (Group B Streptococcus) has a high rate of resistance to clindamycin. Therefore, clindamycin is not recommended for treatment unless susceptibility testing has been performed.      [place] Weight: 3.82 kg (8 lb 6.8 oz)  Apgar scores: 7, 9     Natasha, Male-Se [[id number]]      Labor Event Times      Latent labor onset date/time: 2024    Active labor onset date: 24 Onset time:  8:15 PM   Dilation complete date: 24 Complete time:  7:16 AM   Start pushing date/time: 2024 0716          Labor Events     labor?: No   steroids: None  Labor Type: Induction/Cervical ripening  Predominate monitoring during 1st stage: continuous electronic fetal monitoring     Antibiotics received during labor?: Yes  Reason for Antibiotics: GBS  Antibiotics received for GBS: Penicillin       Rupture date/time: 24   Rupture type: Spontaneous Rupture of Membranes  Fluid color: Clear  Fluid odor: Normal     Induction: Oxytocin  Induction date/time: 24    Cervical ripening date/time:      Indications for induction: Diabetes (Comment to specify)     Augmentation: Oxytocin  Indications for augmentation: Ineffective Contraction Pattern       Delivery/Placenta Date and Time      Delivery Date: 24 Delivery Time:  8:05 AM   Placenta Date/Time: 2024  8:11 AM  Oxytocin given at the time of delivery: after delivery of baby  Delivering clinician: Ev Turcios MD   Other personnel present at delivery:  Provider Role   Amy Hubbard RN Registered Nurse   Dana Ng RN              Vaginal Counts       Initial count performed by 2 team members:  Two Team Members   Dr. Karolina Hubbard RN         [place] Suture Needles Sponges (RETIRED) Instruments   Initial counts 0 0 0    Added to count   5    Relief counts       Final counts               Placed during labor Accounted for at the end of labor   FSE No NA   IUPC  "No NA   Cervidil No NA                             Apgars    Living status: Living   1 Minute 5 Minute 10 Minute 15 Minute 20 Minute   Skin color:        Heart rate:        Reflex irritability:        Muscle tone:        Respiratory effort:        Total:               Cord      Cord Complications: Nuchal   Nuchal Intervention: reduced         Nuchal cord description: tight nuchal cord         Stem cell collection?: No            Measurements      Weight: 8 lb 6.8 oz Length: 1' 10\"     Head circumference: 36.8 cm           Labor Events and Shoulder Dystocia    Fetal Tracing Prior to Delivery: Category 1  Shoulder dystocia present?: Neg       Delivery (Maternal) (Provider to Complete) (024336)    Episiotomy: None  Perineal lacerations: None    Repair suture: None  Genital tract inspection done: Pos       Blood Loss  Mother: Se Natasha #[id number]     Start of Mother's Information      Delivery Blood Loss   Intrapartum & Postpartum: 24 - 24 08    Delivery Admission: 24 0704 - 24 0844         Intrapartum & Postpartum Delivery Admission    None                  End of Mother's Information  Mother: Se Natasha #[id number]                Delivery - Provider to Complete (146954)    Delivering clinician: Ev Turcios MD  Delivery Type (Choose the 1 that will go to the Birth History): , Spontaneous                         Other personnel:  Provider Role   Amy Hubbrad RN Registered Nurse   Dana Ng RN                     Placenta    Date/Time: 2024  8:11 AM  Removal: Manual Removal  Comments: Manual removal of membranes  Disposition: Hospital disposal             Anesthesia    Method: Epidural                    Presentation and Position    Presentation: Vertex    Position: Left Occiput Anterior                     Delivery was supervised by attending physician Dr. Ev Rivera MD PGY3 2024  [facility] " Residency Program

## 2024-12-21 VITALS
TEMPERATURE: 97.5 F | BODY MASS INDEX: 30.3 KG/M2 | WEIGHT: 160.5 LBS | HEART RATE: 74 BPM | OXYGEN SATURATION: 98 % | SYSTOLIC BLOOD PRESSURE: 111 MMHG | HEIGHT: 61 IN | RESPIRATION RATE: 16 BRPM | DIASTOLIC BLOOD PRESSURE: 80 MMHG

## 2024-12-21 PROBLEM — D62 ANEMIA DUE TO BLOOD LOSS, ACUTE: Status: ACTIVE | Noted: 2024-12-21

## 2024-12-21 LAB
BACTERIA UR CULT: ABNORMAL
BACTERIA UR CULT: ABNORMAL
GLUCOSE BLDC GLUCOMTR-MCNC: 80 MG/DL (ref 70–99)
HGB BLD-MCNC: 7.7 G/DL (ref 11.7–15.7)

## 2024-12-21 PROCEDURE — 85018 HEMOGLOBIN: CPT

## 2024-12-21 PROCEDURE — 99207 PR SUBSEQUENT HOSPITAL CARE FOR MOTHER, 15 MINUTES: CPT | Performed by: FAMILY MEDICINE

## 2024-12-21 PROCEDURE — 250N000013 HC RX MED GY IP 250 OP 250 PS 637

## 2024-12-21 PROCEDURE — 36415 COLL VENOUS BLD VENIPUNCTURE: CPT

## 2024-12-21 RX ADMIN — IBUPROFEN 800 MG: 800 TABLET, FILM COATED ORAL at 02:48

## 2024-12-21 ASSESSMENT — ACTIVITIES OF DAILY LIVING (ADL)
ADLS_ACUITY_SCORE: 23

## 2024-12-21 NOTE — PROVIDER NOTIFICATION
Dr. Turcios called for status update;    Patient on 18mili/units of pitocin. Contractions spacing out currently but at other times doubling and coming frequently. Patient has been almost ready to request an epidural several times and then spaces out significantly.  RN just completed side lying release with patient to try to get contractions more regular.  Majority of shift FHR tracing CAT I- only a few variables noted when on birth ball.               
Dr. Turcios updated on patient's arrival to L&D for induction of labor.     -Patient is a .   -Significant [id number] for the twins (delivery #2) and has a vaginal delivery since, GDM insulin controlled, AMA, PPH x2 needing blood transfusion, retained placenta with last delivery, GBS +    -FHR CAT I   -Patient states occasionally anderson- not picking much up on monitor yet    -SVE -80/-2    -Provider states to have resident place orders.  -No need for ultrasound as baby was head down on BPP last week  -RN to start ABX once patient anderson more regularly             
Provider notified of hemoglobin of 7.7 AM provider to make plan and discuss with patient.   
Admitted

## 2024-12-21 NOTE — DISCHARGE INSTRUCTIONS
Warning Signs after Having a Baby    Keep this paper on your fridge or somewhere else where you can see it.    Call your provider if you have any of these symptoms up to 12 weeks after having your baby.    Thoughts of hurting yourself or your baby  Pain in your chest or trouble breathing  Severe headache not helped by pain medicine  Eyesight concerns (blurry vision, seeing spots or flashes of light, other changes to eyesight)  Fainting, shaking or other signs of a seizure    Call 9-1-1 if you feel that it is an emergency.     The symptoms below can happen to anyone after giving birth. They can be very serious. Call your provider if you have any of these warning signs.    My provider s phone number: _______________________    Losing too much blood (hemorrhage)    Call your provider if you soak through a pad in less than an hour or pass blood clots bigger than a golf ball. These may be signs that you are bleeding too much.    Blood clots in the legs or lungs    After you give birth, your body naturally clots its blood to help prevent blood loss. Sometimes this increased clotting can happen in other areas of the body, like the legs or lungs. This can block your blood flow and be very dangerous.     Call your provider if you:  Have a red, swollen spot on the back of your leg that is warm or painful when you touch it.   Are coughing up blood.     Infection    Call your provider if you have any of these symptoms:  Fever of 100.4 F (38 C) or higher.  Pain or redness around your stitches if you had an incision.   Any yellow, white, or green fluid coming from places where you had stitches or surgery.    Mood Problems (postpartum depression)    Many people feel sad or have mood changes after having a baby. But for some people, these mood swings are worse.     Call your provider right away if you feel so anxious or nervous that you can't care for yourself or your baby.    Preeclampsia (high blood pressure)    Even if you  didn't have high blood pressure when you were pregnant, you are at risk for the high blood pressure disease called preeclampsia. This risk can last up to 12 weeks after giving birth.     Call your provider if you have:   Pain on your right side under your rib cage  Sudden swelling in the hands and face    Remember: You know your body. If something doesn't feel right, get medical help.     For informational purposes only. Not to replace the advice of your health care provider. Copyright 2020 [facility]. All rights reserved. Clinically reviewed by Annabel Mercado, RNC-OB, MSN. [employer] 306882 - Rev 02/23.

## 2024-12-21 NOTE — DISCHARGE SUMMARY
[facility]    Discharge Summary  Obstetrics    Date of Admission:  2024  Date of Discharge:  2024  Discharging Provider: Serena Jasmine DO    Discharge Diagnoses     AMA  GDMA2  GBS positive  Acute on chronic blood loss anemia  Postpartum hemorrhage EBL 1221 mL    History of Present Illness   Se Kaur is a 49 year old female who presented for induction of labor secondary to GDM insulin controlled, AMA. GBS positive received appropriate antibiotic prophylaxis.s/p .     Hospital Course   The patient's hospital course was remarkable for postpartum hemorrhage EBL 1221 mL.  Discharge hemoglobin 7.7, asymptomatic.  Vitally stable, resume oral iron supplementation at home.  She recovered as anticipated and experienced no post-delivery complications.  On discharge, her pain was well controlled. Vaginal bleeding is similar to peak menstrual flow.  Voiding without difficulty.  Ambulating well and tolerating a normal diet.  No fevers.  Breastfeeding and formula feeding well.  Infant is stable.  She was discharged on post-partum day # per mother's request.    Post-partum hemoglobin:   Hemoglobin   Date Value Ref Range Status   2024 7.7 (L) 11.7 - 15.7 g/dL Final       [place] Depression Scale  Thoughts of Harming Self: Never  Total Score: 4      Serena Jasmine DO    Discharge Disposition   Discharged to home   Condition at discharge: Stable resume oral iron supplementation at home    Primary Care Physician   Ev Turcios    Consultations This Hospital Stay   LACTATION IP CONSULT    Discharge Orders      Activity    Activity as tolerated     Reason for your hospital stay    Birth and postpartum care     Gestational diabetes follow up    Follow up for fasting blood sugar and 2 hour 75gm glucose load at 6 weeks postpartum.     Follow Up (2 and 6 weeks)    Follow up with provider in 2 weeks and 6 weeks for post-delivery checks     Breast pump - Manual/Electric    Breast Pump  Documentation:  Manual/Electric Pump: To support adequate breast milk production and nutrition for infant.     I, the undersigned, certify that the above prescribed supplies are medically necessary for this patient and is both reasonable and necessary in reference to accepted standards of medical and necessary in reference to accepted standards of medical practice in the treatment of this patient's condition and is not prescribed as a convenience.     Diet    Resume previous diet     Discharge Medications   Current Discharge Medication List        CONTINUE these medications which have NOT CHANGED    Details   Continuous Glucose Sensor (DEXCOM G7 SENSOR) MISC 1 each every 10 days. Change sensor every 10 days  Qty: 9 each, Refills: 5    Associated Diagnoses: Multigravida of advanced maternal age in third trimester; Insulin controlled gestational diabetes mellitus (GDM) in third trimester      Cyanocobalamin (B-12 PO)       ferrous gluconate (FERGON) 324 (38 Fe) MG tablet Take 324 mg by mouth every evening      Prenatal Vit-Iron Carbonyl-FA (PNV TABS 29-1) 29-1 MG TABS Take 1 tablet by mouth daily  Qty: 90 tablet, Refills: 3    Associated Diagnoses: Multigravida of advanced maternal age in second trimester      vitamin D3 (CHOLECALCIFEROL) 50 mcg (2000 units) tablet Take 1 tablet by mouth every evening           STOP taking these medications       ASPIRIN 81 PO Comments:   Reason for Stopping:         clotrimazole (LOTRIMIN) 1 % vaginal cream Comments:   Reason for Stopping:         clotrimazole (LOTRIMIN) 1 % vaginal cream Comments:   Reason for Stopping:         insulin glargine (LANTUS PEN) 100 UNIT/ML pen Comments:   Reason for Stopping:             Allergies   No Known Allergies

## 2024-12-21 NOTE — PLAN OF CARE
Goal Outcome Evaluation:       Pt bonding with baby and vitals stable. Her fundus is midline and firm without massage and bleeding is light. Her pain is well managed with no pain meds this shift. She is breastfeeding baby and supplementing formula. She is voiding spontaneously with no difficulty.     Problem: Adult Inpatient Plan of Care  Goal: Optimal Comfort and Wellbeing  Outcome: Progressing  Intervention: Provide Person-Centered Care  Recent Flowsheet Documentation  Taken 12/20/2024 1742 by Rafaela Patel RN  Trust Relationship/Rapport:   care explained   choices provided   questions answered   questions encouraged   thoughts/feelings acknowledged     Problem: Postpartum (Vaginal Delivery)  Goal: Absence of Infection Signs and Symptoms  Outcome: Progressing     Problem: Postpartum (Vaginal Delivery)  Goal: Effective Urinary Elimination  Outcome: Progressing     Problem: Postpartum (Vaginal Delivery)  Goal: Optimal Pain Control and Function  Outcome: Progressing

## 2024-12-21 NOTE — PLAN OF CARE
Goal Outcome Evaluation:             VSS and afebrile. Denies symptoms of low hemoglobin, not lightheaded or dizzy. Fundus firm & midline and lochia WDL. Taking ibuprofen for pain. Formula and breast feeding and bonding well with .            Problem: Adult Inpatient Plan of Care  Goal: Optimal Comfort and Wellbeing  Outcome: Progressing     Problem: Postpartum (Vaginal Delivery)  Goal: Successful Parent Role Transition  Outcome: Progressing  Goal: Absence of Infection Signs and Symptoms  Outcome: Progressing

## 2024-12-21 NOTE — PLAN OF CARE
Patient bonding well with . Voiding spontaneously. Pain is well controlled, Denies medication at this time Denies headache/vision changes. Fundus firm, lochia scant. VSS.      Problem: Adult Inpatient Plan of Care  Goal: Absence of Hospital-Acquired Illness or Injury  Intervention: Prevent Skin Injury  Recent Flowsheet Documentation  Taken 2024 by Tami Su, RN  Body Position: position changed independently  Goal: Optimal Comfort and Wellbeing  Intervention: Provide Person-Centered Care  Recent Flowsheet Documentation  Taken 2024 by Tami Su, RN  Trust Relationship/Rapport:   care explained   choices provided   questions answered   questions encouraged   thoughts/feelings acknowledged     Problem: Postpartum (Vaginal Delivery)  Goal: Successful Parent Role Transition  Outcome: Adequate for Care Transition  Goal: Optimal Pain Control and Function  Outcome: Adequate for Care Transition  Goal: Effective Urinary Elimination  Outcome: Adequate for Care Transition

## 2024-12-26 ENCOUNTER — PATIENT OUTREACH (OUTPATIENT)
Dept: CARE COORDINATION | Facility: CLINIC | Age: 49
End: 2024-12-26
Payer: COMMERCIAL

## 2024-12-29 ENCOUNTER — NURSE TRIAGE (OUTPATIENT)
Dept: FAMILY MEDICINE | Facility: CLINIC | Age: 49
End: 2024-12-29
Payer: COMMERCIAL

## 2024-12-29 DIAGNOSIS — K64.4 EXTERNAL HEMORRHOIDS: Primary | ICD-10-CM

## 2024-12-31 RX ORDER — HYDROCORTISONE ACETATE 25 MG/1
25 SUPPOSITORY RECTAL 2 TIMES DAILY
Qty: 20 SUPPOSITORY | Refills: 1 | Status: SHIPPED | OUTPATIENT
Start: 2024-12-31

## 2024-12-31 NOTE — TELEPHONE ENCOUNTER
"20th delivered   Mild hemorrhoids prior to birth, now     Nurse Triage SBAR    Is this a 2nd Level Triage? NO    Situation: hemorrhoids     Background: gave birth on 12/20/24 and has had large, swollen, painful hemorrhoids since     Assessment:   1. SYMPTOM:  swollen and painful hemorrhoids  2. ONSET: after birth  3. RECTAL PAIN: 8/10, with Ibuprofen 5/10  4. RECTAL ITCHING: no  5. CONSTIPATION: no   6. CAUSE: birth  7. OTHER SYMPTOMS: Can't walk and can't sit   Rectal bleeding at times none since a couple days ago  No fever     Taking Ibuprofen which does help but she doesn't want to take long term and \"needs a solution.\"  Warm sitz baths, using ice, witch hazel and preparation H spray with some relief.      Protocol Recommended Disposition:   See in Office Today    Recommendation:   No available appointments today. Patient has an appointment on 1/3/02 with DIAMOND Aldrich. Is it okay for patient to wait until Friday to be seen?     Routed to provider      Reason for Disposition   MODERATE-SEVERE rectal pain (i.e., interferes with school, work, or sleep)    Additional Information   Negative: Sounds like a life-threatening emergency to the triager   Negative: Diarrhea is main symptom   Negative: Constipation is main symptom (e.g., pain or discomfort caused by passage of hard BMs)   Negative: Blood in or on bowel movement is main symptom   Negative: MPOX SUSPECTED (e.g., direct skin contact such as sex, recent travel to [place] or [place]) and any SYMPTOMS OF MPOX (e.g., rash, fever, muscle aches, or swollen lymph nodes)   Negative: AT RISK FOR MPOX (men-who-have-sex-with-men) and POSSIBLE EXPOSURE (e.g., multiple sex partners in past 21 days) and ANY SYMPTOMS OF MPOX (e.g., rash, fever, muscle aches, or swollen lymph nodes)   Negative: Sexual assault or rape (sexual intercourse or activity occurs without freely given consent), known or suspected   Negative: Injury to rectum   Negative: Patient sounds very sick or weak " to the triager   Negative: Severe rectal pain   Negative: Rectal pain or redness and fever > 100.4 F (38.0 C)   Negative: Acute onset rectal pain and constipation (straining with rectal pressure or fullness), which is not relieved by Sitz bath or suppository    Protocols used: Rectal Symptoms-A-[place]

## 2024-12-31 NOTE — TELEPHONE ENCOUNTER
Outgoing call to patient to relay PCP's detailed message. No answer. Left message to call back.     If/when patient calls back please relay provider message. (12/31 at 1241)

## 2024-12-31 NOTE — TELEPHONE ENCOUNTER
I will write prescription for Anusol rectal suppository to be used every 8 hours as needed for pain and discomfort  Continue sitz bath as much as she can  Keep the appointment on January 3 for follow-up    Ev Turcios MD

## 2025-01-08 ENCOUNTER — TELEPHONE (OUTPATIENT)
Dept: SURGERY | Facility: CLINIC | Age: 50
End: 2025-01-08
Payer: COMMERCIAL

## 2025-01-08 NOTE — CONFIDENTIAL NOTE
Patient gave birth on 12/20  Calls today with painful hemorrhoids  Saw PCP 1/3 and noted blood blot in hemorrhoids  Today she reports the pain is improving, but still painful, no bleeding.  Discussed conservative management  We discussed seeing today for clot excision vs seeing her in a month or so once she is more healed from labor. She opted to schedule in 1 month for assessment  Scheduled with Dr. Hassan on 2/13

## 2025-01-08 NOTE — TELEPHONE ENCOUNTER
[facility] Call Center    Phone Message    May a detailed message be left on voicemail: yes     Reason for Call: Other: Patient called to schedule from DX: Thrombosed external hemorrhoids.  Patient stated she has had extreme anal pain.  Please follow up with patient.     Action Taken: Message routed to:  [facility]    Travel Screening: Not Applicable

## 2025-01-09 ENCOUNTER — PATIENT OUTREACH (OUTPATIENT)
Dept: CARE COORDINATION | Facility: CLINIC | Age: 50
End: 2025-01-09

## 2025-01-09 NOTE — TELEPHONE ENCOUNTER
Diagnosis, Referred by & from: Hemorrhoids   Appt date: 2/13/2025   NOTES STATUS DETAILS   OFFICE NOTE from referring provider [facility]:  1/3/25 - PCC OV with ARTHUR Lee   OFFICE NOTE from other specialist [facility] / [facility]:  12/17/24 - PCC OV with Dr. Turcios    [facility]:  10/12/20 - OBGYN OV with Dr. De Santiago   DISCHARGE SUMMARY from [facility]:  12/19/24 - Admission with Dr. Turcios   DISCHARGE REPORT from the ER N/A    OPERATIVE REPORT N/A    MEDICATION LIST Internal    LABS N/A    DIAGNOSTIC PROCEDURES N/A    IMAGING (DISC & REPORT) N/A

## 2025-02-13 ENCOUNTER — PRE VISIT (OUTPATIENT)
Dept: SURGERY | Facility: CLINIC | Age: 50
End: 2025-02-13

## 2025-03-15 ENCOUNTER — HEALTH MAINTENANCE LETTER (OUTPATIENT)
Age: 50
End: 2025-03-15

## 2025-05-17 ENCOUNTER — HEALTH MAINTENANCE LETTER (OUTPATIENT)
Age: 50
End: 2025-05-17

## 2025-07-24 ENCOUNTER — PATIENT OUTREACH (OUTPATIENT)
Dept: CARE COORDINATION | Facility: CLINIC | Age: 50
End: 2025-07-24

## 2025-08-01 ENCOUNTER — TRANSFERRED RECORDS (OUTPATIENT)
Dept: MULTI SPECIALTY CLINIC | Facility: CLINIC | Age: 50
End: 2025-08-01
Payer: COMMERCIAL

## 2025-08-01 LAB — RETINOPATHY: NORMAL

## 2025-08-29 PROBLEM — E11.9 TYPE 2 DIABETES MELLITUS WITHOUT COMPLICATION, WITHOUT LONG-TERM CURRENT USE OF INSULIN (H): Status: ACTIVE | Noted: 2020-01-23

## 2025-08-29 PROBLEM — Z34.90 ENCOUNTER FOR INDUCTION OF LABOR: Status: RESOLVED | Noted: 2021-09-26 | Resolved: 2025-08-29

## 2025-08-29 PROBLEM — Z86.32 HISTORY OF GESTATIONAL DIABETES: Status: RESOLVED | Noted: 2024-07-16 | Resolved: 2025-08-29

## 2025-08-29 PROBLEM — E11.9 TYPE 2 DIABETES MELLITUS WITHOUT COMPLICATION, WITHOUT LONG-TERM CURRENT USE OF INSULIN (H): Status: RESOLVED | Noted: 2020-01-23 | Resolved: 2025-08-29

## 2025-08-29 PROBLEM — R73.03 PRE-DIABETES: Status: RESOLVED | Noted: 2020-01-01 | Resolved: 2025-08-29

## 2025-08-29 PROBLEM — O09.32 LIMITED PRENATAL CARE IN SECOND TRIMESTER: Status: RESOLVED | Noted: 2024-10-09 | Resolved: 2025-08-29

## 2025-08-29 PROBLEM — O09.523 MULTIGRAVIDA OF ADVANCED MATERNAL AGE IN THIRD TRIMESTER: Status: RESOLVED | Noted: 2024-07-16 | Resolved: 2025-08-29

## 2025-08-30 LAB — BACTERIA UR CULT: ABNORMAL

## 2025-09-01 ENCOUNTER — PATIENT OUTREACH (OUTPATIENT)
Dept: CARE COORDINATION | Facility: CLINIC | Age: 50
End: 2025-09-01
Payer: COMMERCIAL